# Patient Record
Sex: FEMALE | Race: BLACK OR AFRICAN AMERICAN | Employment: UNEMPLOYED | URBAN - METROPOLITAN AREA
[De-identification: names, ages, dates, MRNs, and addresses within clinical notes are randomized per-mention and may not be internally consistent; named-entity substitution may affect disease eponyms.]

---

## 2017-05-23 ENCOUNTER — HOSPITAL ENCOUNTER (EMERGENCY)
Age: 27
Discharge: LEFT BEFORE DISCHARGE | End: 2017-05-23
Attending: STUDENT IN AN ORGANIZED HEALTH CARE EDUCATION/TRAINING PROGRAM
Payer: COMMERCIAL

## 2017-05-23 VITALS
HEART RATE: 71 BPM | WEIGHT: 135 LBS | HEIGHT: 62 IN | OXYGEN SATURATION: 100 % | SYSTOLIC BLOOD PRESSURE: 110 MMHG | BODY MASS INDEX: 24.84 KG/M2 | DIASTOLIC BLOOD PRESSURE: 71 MMHG | RESPIRATION RATE: 18 BRPM | TEMPERATURE: 98.1 F

## 2017-05-23 DIAGNOSIS — Z91.199 PATIENT'S NONCOMPLIANCE WITH OTHER MEDICAL TREATMENT AND REGIMEN: Primary | ICD-10-CM

## 2017-05-23 DIAGNOSIS — Z34.90 EARLY STAGE OF PREGNANCY: ICD-10-CM

## 2017-05-23 DIAGNOSIS — O20.0 THREATENED MISCARRIAGE: ICD-10-CM

## 2017-05-23 LAB
ABO/RH: NORMAL
ALBUMIN SERPL-MCNC: 4.3 G/DL (ref 3.9–4.9)
ALP BLD-CCNC: 51 U/L (ref 40–130)
ALT SERPL-CCNC: 16 U/L (ref 0–33)
ANION GAP SERPL CALCULATED.3IONS-SCNC: 11 MEQ/L (ref 7–13)
ANISOCYTOSIS: ABNORMAL
APTT: 31.5 SEC (ref 21.6–35.4)
AST SERPL-CCNC: 15 U/L (ref 0–35)
BASOPHILS ABSOLUTE: 0.1 K/UL (ref 0–0.2)
BASOPHILS RELATIVE PERCENT: 1.4 %
BILIRUB SERPL-MCNC: 1.1 MG/DL (ref 0–1.2)
BUN BLDV-MCNC: 8 MG/DL (ref 6–20)
CALCIUM SERPL-MCNC: 9 MG/DL (ref 8.6–10.2)
CHLORIDE BLD-SCNC: 105 MEQ/L (ref 98–107)
CO2: 23 MEQ/L (ref 22–29)
CREAT SERPL-MCNC: 0.81 MG/DL (ref 0.5–0.9)
EOSINOPHILS ABSOLUTE: 0.1 K/UL (ref 0–0.7)
EOSINOPHILS RELATIVE PERCENT: 1.7 %
GFR AFRICAN AMERICAN: >60
GFR NON-AFRICAN AMERICAN: >60
GLOBULIN: 2.2 G/DL (ref 2.3–3.5)
GLUCOSE BLD-MCNC: 98 MG/DL (ref 74–109)
GONADOTROPIN, CHORIONIC (HCG) QUANT: <0.1 MIU/ML
HCT VFR BLD CALC: 38.8 % (ref 37–47)
HEMOGLOBIN: 12.1 G/DL (ref 12–16)
INR BLD: 1
LYMPHOCYTES ABSOLUTE: 2 K/UL (ref 1–4.8)
LYMPHOCYTES RELATIVE PERCENT: 27.8 %
MCH RBC QN AUTO: 23.2 PG (ref 27–31.3)
MCHC RBC AUTO-ENTMCNC: 31.2 % (ref 33–37)
MCV RBC AUTO: 74.5 FL (ref 82–100)
MICROCYTES: ABNORMAL
MONOCYTES ABSOLUTE: 0.4 K/UL (ref 0.2–0.8)
MONOCYTES RELATIVE PERCENT: 4.9 %
NEUTROPHILS ABSOLUTE: 4.7 K/UL (ref 1.4–6.5)
NEUTROPHILS RELATIVE PERCENT: 64.2 %
PDW BLD-RTO: 15.6 % (ref 11.5–14.5)
PLATELET # BLD: 261 K/UL (ref 130–400)
POTASSIUM SERPL-SCNC: 3.8 MEQ/L (ref 3.5–5.1)
PROTHROMBIN TIME: 10.7 SEC (ref 8.1–13.7)
RBC # BLD: 5.2 M/UL (ref 4.2–5.4)
SODIUM BLD-SCNC: 139 MEQ/L (ref 132–144)
TOTAL PROTEIN: 6.5 G/DL (ref 6.4–8.1)
WBC # BLD: 7.3 K/UL (ref 4.8–10.8)

## 2017-05-23 PROCEDURE — 84702 CHORIONIC GONADOTROPIN TEST: CPT

## 2017-05-23 PROCEDURE — 86900 BLOOD TYPING SEROLOGIC ABO: CPT

## 2017-05-23 PROCEDURE — 80053 COMPREHEN METABOLIC PANEL: CPT

## 2017-05-23 PROCEDURE — 99283 EMERGENCY DEPT VISIT LOW MDM: CPT

## 2017-05-23 PROCEDURE — 85025 COMPLETE CBC W/AUTO DIFF WBC: CPT

## 2017-05-23 PROCEDURE — 86901 BLOOD TYPING SEROLOGIC RH(D): CPT

## 2017-05-23 PROCEDURE — 85610 PROTHROMBIN TIME: CPT

## 2017-05-23 PROCEDURE — 85730 THROMBOPLASTIN TIME PARTIAL: CPT

## 2017-05-23 PROCEDURE — 36415 COLL VENOUS BLD VENIPUNCTURE: CPT

## 2017-05-23 ASSESSMENT — ENCOUNTER SYMPTOMS
VOMITING: 0
TROUBLE SWALLOWING: 0
COUGH: 0
ABDOMINAL PAIN: 0
SINUS PRESSURE: 0
BACK PAIN: 0
SHORTNESS OF BREATH: 0
CHEST TIGHTNESS: 0
DIARRHEA: 0

## 2017-09-11 ENCOUNTER — HOSPITAL ENCOUNTER (EMERGENCY)
Age: 27
Discharge: HOME | End: 2017-09-11
Payer: COMMERCIAL

## 2017-09-11 VITALS
DIASTOLIC BLOOD PRESSURE: 75 MMHG | BODY MASS INDEX: 21.4 KG/M2 | WEIGHT: 117 LBS | TEMPERATURE: 98.2 F | HEART RATE: 75 BPM | RESPIRATION RATE: 18 BRPM | OXYGEN SATURATION: 100 % | SYSTOLIC BLOOD PRESSURE: 114 MMHG

## 2017-09-11 DIAGNOSIS — N39.0 URINARY TRACT INFECTION WITHOUT HEMATURIA, SITE UNSPECIFIED: Primary | ICD-10-CM

## 2017-09-11 LAB
BACTERIA: ABNORMAL /HPF
BILIRUBIN URINE: NEGATIVE
BLOOD, URINE: NEGATIVE
CLARITY: ABNORMAL
COLOR: ABNORMAL
EPITHELIAL CELLS, UA: ABNORMAL /HPF
GLUCOSE URINE: NEGATIVE MG/DL
KETONES, URINE: NEGATIVE MG/DL
LEUKOCYTE ESTERASE, URINE: ABNORMAL
MUCUS: PRESENT
NITRITE, URINE: POSITIVE
PH UA: 6.5 (ref 5–9)
PROTEIN UA: NEGATIVE MG/DL
RBC UA: ABNORMAL /HPF (ref 0–2)
SPECIFIC GRAVITY UA: 1.01 (ref 1–1.03)
URINE REFLEX TO CULTURE: YES
UROBILINOGEN, URINE: 1 E.U./DL
WBC UA: ABNORMAL /HPF (ref 0–5)

## 2017-09-11 PROCEDURE — 87086 URINE CULTURE/COLONY COUNT: CPT

## 2017-09-11 PROCEDURE — 99283 EMERGENCY DEPT VISIT LOW MDM: CPT

## 2017-09-11 PROCEDURE — 81001 URINALYSIS AUTO W/SCOPE: CPT

## 2017-09-11 RX ORDER — FLUCONAZOLE 150 MG/1
150 TABLET ORAL ONCE
Qty: 2 TABLET | Refills: 0 | Status: SHIPPED | OUTPATIENT
Start: 2017-09-11 | End: 2017-09-11

## 2017-09-11 RX ORDER — CEPHALEXIN 500 MG/1
500 CAPSULE ORAL 4 TIMES DAILY
Qty: 12 CAPSULE | Refills: 0 | Status: SHIPPED | OUTPATIENT
Start: 2017-09-11 | End: 2017-09-14

## 2017-09-11 ASSESSMENT — PAIN DESCRIPTION - LOCATION: LOCATION: PELVIS

## 2017-09-11 ASSESSMENT — PAIN SCALES - GENERAL: PAINLEVEL_OUTOF10: 4

## 2017-09-11 ASSESSMENT — ENCOUNTER SYMPTOMS
SORE THROAT: 0
COUGH: 0
ABDOMINAL PAIN: 0
PHOTOPHOBIA: 0
TROUBLE SWALLOWING: 0
VOMITING: 0
SHORTNESS OF BREATH: 0
BACK PAIN: 0

## 2017-09-11 ASSESSMENT — PAIN DESCRIPTION - PAIN TYPE: TYPE: ACUTE PAIN

## 2017-09-12 LAB — URINE CULTURE, ROUTINE: NORMAL

## 2017-12-17 ENCOUNTER — HOSPITAL ENCOUNTER (EMERGENCY)
Age: 27
Discharge: HOME | End: 2017-12-17
Attending: EMERGENCY MEDICINE
Payer: COMMERCIAL

## 2017-12-17 VITALS
OXYGEN SATURATION: 100 % | HEART RATE: 75 BPM | RESPIRATION RATE: 17 BRPM | DIASTOLIC BLOOD PRESSURE: 82 MMHG | SYSTOLIC BLOOD PRESSURE: 126 MMHG

## 2017-12-17 DIAGNOSIS — Z13.9 ENCOUNTER FOR MEDICAL SCREENING EXAMINATION: Primary | ICD-10-CM

## 2017-12-17 PROCEDURE — 99283 EMERGENCY DEPT VISIT LOW MDM: CPT

## 2017-12-17 ASSESSMENT — ENCOUNTER SYMPTOMS
ABDOMINAL PAIN: 0
COUGH: 0
VOMITING: 0
ABDOMINAL DISTENTION: 0
SHORTNESS OF BREATH: 0
EYE DISCHARGE: 0
WHEEZING: 0
PHOTOPHOBIA: 0
CHEST TIGHTNESS: 0
SORE THROAT: 0

## 2017-12-17 NOTE — ED NOTES
Pt presents combative, screaming at staff. Pt uncooperative. Abusive towards staff, security. Dr Ling Lopez at bedside to medically clear pt.  [employer] at bedside due to pt under arrest. Pt refusing to answer all questions asked, refusing [address], RN  12/17/17 5530

## 2018-01-21 ENCOUNTER — APPOINTMENT (OUTPATIENT)
Dept: GENERAL RADIOLOGY | Age: 28
End: 2018-01-21
Payer: COMMERCIAL

## 2018-01-21 ENCOUNTER — HOSPITAL ENCOUNTER (EMERGENCY)
Age: 28
Discharge: HOME | End: 2018-01-21
Payer: COMMERCIAL

## 2018-01-21 VITALS
HEIGHT: 62 IN | TEMPERATURE: 98.3 F | SYSTOLIC BLOOD PRESSURE: 114 MMHG | HEART RATE: 84 BPM | OXYGEN SATURATION: 99 % | BODY MASS INDEX: 21.53 KG/M2 | DIASTOLIC BLOOD PRESSURE: 74 MMHG | WEIGHT: 117 LBS | RESPIRATION RATE: 18 BRPM

## 2018-01-21 DIAGNOSIS — J06.9 UPPER RESPIRATORY TRACT INFECTION, UNSPECIFIED TYPE: Primary | ICD-10-CM

## 2018-01-21 LAB
RAPID INFLUENZA  B AGN: NEGATIVE
RAPID INFLUENZA A AGN: NEGATIVE

## 2018-01-21 PROCEDURE — 86403 PARTICLE AGGLUT ANTBDY SCRN: CPT

## 2018-01-21 PROCEDURE — 99284 EMERGENCY DEPT VISIT MOD MDM: CPT

## 2018-01-21 PROCEDURE — 6370000000 HC RX 637 (ALT 250 FOR IP): Performed by: PHYSICIAN ASSISTANT

## 2018-01-21 PROCEDURE — 71046 X-RAY EXAM CHEST 2 VIEWS: CPT

## 2018-01-21 RX ORDER — AZITHROMYCIN 250 MG/1
TABLET, FILM COATED ORAL
Qty: 1 PACKET | Refills: 0 | Status: SHIPPED | OUTPATIENT
Start: 2018-01-21 | End: 2018-01-31

## 2018-01-21 RX ORDER — AZITHROMYCIN 500 MG/1
500 TABLET, FILM COATED ORAL ONCE
Status: COMPLETED | OUTPATIENT
Start: 2018-01-21 | End: 2018-01-21

## 2018-01-21 RX ORDER — PROMETHAZINE HYDROCHLORIDE AND CODEINE PHOSPHATE 6.25; 1 MG/5ML; MG/5ML
5 SYRUP ORAL 4 TIMES DAILY PRN
Qty: 60 ML | Refills: 0 | Status: SHIPPED | OUTPATIENT
Start: 2018-01-21 | End: 2018-01-24

## 2018-01-21 RX ORDER — GUAIFENESIN, PSEUDOEPHEDRINE HYDROCHLORIDE 600; 60 MG/1; MG/1
1 TABLET, EXTENDED RELEASE ORAL EVERY 12 HOURS
Qty: 14 TABLET | Refills: 0 | Status: SHIPPED | OUTPATIENT
Start: 2018-01-21 | End: 2018-01-28

## 2018-01-21 RX ADMIN — AZITHROMYCIN 500 MG: 500 TABLET, FILM COATED ORAL at 22:05

## 2018-01-21 ASSESSMENT — ENCOUNTER SYMPTOMS
SHORTNESS OF BREATH: 0
ABDOMINAL PAIN: 0
FACIAL SWELLING: 1
SORE THROAT: 1
EYE PAIN: 0
TROUBLE SWALLOWING: 0
APNEA: 0
COUGH: 1
COLOR CHANGE: 0
SINUS PRESSURE: 1
ALLERGIC/IMMUNOLOGIC NEGATIVE: 1
NAUSEA: 1
VOMITING: 1

## 2018-01-21 ASSESSMENT — PAIN DESCRIPTION - DESCRIPTORS: DESCRIPTORS: ACHING

## 2018-01-21 ASSESSMENT — PAIN DESCRIPTION - PAIN TYPE: TYPE: ACUTE PAIN

## 2018-01-21 ASSESSMENT — PAIN SCALES - GENERAL: PAINLEVEL_OUTOF10: 8

## 2018-01-21 ASSESSMENT — PAIN DESCRIPTION - LOCATION: LOCATION: GENERALIZED

## 2018-01-22 NOTE — ED PROVIDER NOTES
Days 2 through 5. PROMETHAZINE-CODEINE (PHENERGAN WITH CODEINE) 6.25-10 MG/5ML SYRUP    Take 5 mLs by mouth 4 times daily as needed for Cough for up to 3 days.     PSEUDOEPHEDRINE-GUAIFENESIN (MUCINEX D)  MG PER EXTENDED RELEASE TABLET    Take 1 tablet by mouth every 12 hours for 7 days       (Please note that portions of this note were completed with a voice recognition program.  Efforts were made to edit the dictations but occasionally words are mis-transcribed.)    STEPHEN Pulido PA-C  01/21/18 1398

## 2018-04-17 ENCOUNTER — HOSPITAL ENCOUNTER (EMERGENCY)
Age: 28
Discharge: HOME | End: 2018-04-17

## 2018-04-17 VITALS
WEIGHT: 120 LBS | HEIGHT: 62 IN | RESPIRATION RATE: 16 BRPM | DIASTOLIC BLOOD PRESSURE: 70 MMHG | OXYGEN SATURATION: 100 % | HEART RATE: 96 BPM | BODY MASS INDEX: 22.08 KG/M2 | SYSTOLIC BLOOD PRESSURE: 114 MMHG | TEMPERATURE: 98.3 F

## 2018-04-17 DIAGNOSIS — B37.31 YEAST INFECTION OF THE VAGINA: ICD-10-CM

## 2018-04-17 DIAGNOSIS — N39.0 URINARY TRACT INFECTION IN FEMALE: Primary | ICD-10-CM

## 2018-04-17 LAB
BACTERIA: ABNORMAL /HPF
BILIRUBIN URINE: NEGATIVE
BLOOD, URINE: ABNORMAL
CLARITY: ABNORMAL
CLUE CELLS: NORMAL
COLOR: YELLOW
EPITHELIAL CELLS, UA: ABNORMAL /HPF
GLUCOSE URINE: NEGATIVE MG/DL
KETONES, URINE: NEGATIVE MG/DL
LEUKOCYTE ESTERASE, URINE: ABNORMAL
NITRITE, URINE: NEGATIVE
PH UA: 7 (ref 5–9)
PREGNANCY TEST URINE, POC: NEGATIVE
PROTEIN UA: NEGATIVE MG/DL
RBC UA: ABNORMAL /HPF (ref 0–2)
SPECIFIC GRAVITY UA: 1.02 (ref 1–1.03)
TRICHOMONAS PREP: NORMAL
TRICHOMONAS VAGINALIS SCREEN: NEGATIVE
URINE REFLEX TO CULTURE: YES
UROBILINOGEN, URINE: 0.2 E.U./DL
WBC UA: ABNORMAL /HPF (ref 0–5)
YEAST WET PREP: NORMAL
YEAST: PRESENT

## 2018-04-17 PROCEDURE — 87086 URINE CULTURE/COLONY COUNT: CPT

## 2018-04-17 PROCEDURE — 87591 N.GONORRHOEAE DNA AMP PROB: CPT

## 2018-04-17 PROCEDURE — 87210 SMEAR WET MOUNT SALINE/INK: CPT

## 2018-04-17 PROCEDURE — 81001 URINALYSIS AUTO W/SCOPE: CPT

## 2018-04-17 PROCEDURE — 99283 EMERGENCY DEPT VISIT LOW MDM: CPT

## 2018-04-17 PROCEDURE — 87491 CHLMYD TRACH DNA AMP PROBE: CPT

## 2018-04-17 PROCEDURE — 87660 TRICHOMONAS VAGIN DIR PROBE: CPT

## 2018-04-17 RX ORDER — FLUCONAZOLE 150 MG/1
150 TABLET ORAL ONCE
Qty: 1 TABLET | Refills: 0 | Status: SHIPPED | OUTPATIENT
Start: 2018-04-17 | End: 2018-04-17

## 2018-04-17 RX ORDER — NITROFURANTOIN 25; 75 MG/1; MG/1
100 CAPSULE ORAL 2 TIMES DAILY
Qty: 20 CAPSULE | Refills: 0 | Status: SHIPPED | OUTPATIENT
Start: 2018-04-17 | End: 2018-04-27

## 2018-04-17 ASSESSMENT — ENCOUNTER SYMPTOMS
ABDOMINAL PAIN: 0
BACK PAIN: 0
SHORTNESS OF BREATH: 0
COUGH: 0

## 2018-04-18 LAB — URINE CULTURE, ROUTINE: NORMAL

## 2018-04-20 LAB
C TRACH DNA GENITAL QL NAA+PROBE: NEGATIVE
N. GONORRHOEAE DNA: NEGATIVE

## 2018-04-25 ENCOUNTER — HOSPITAL ENCOUNTER (EMERGENCY)
Age: 28
Discharge: HOME | End: 2018-04-25

## 2018-04-25 ENCOUNTER — APPOINTMENT (OUTPATIENT)
Dept: GENERAL RADIOLOGY | Age: 28
End: 2018-04-25

## 2018-04-25 VITALS
HEIGHT: 62 IN | WEIGHT: 123 LBS | SYSTOLIC BLOOD PRESSURE: 106 MMHG | TEMPERATURE: 98.5 F | HEART RATE: 82 BPM | DIASTOLIC BLOOD PRESSURE: 75 MMHG | BODY MASS INDEX: 22.63 KG/M2 | OXYGEN SATURATION: 98 % | RESPIRATION RATE: 16 BRPM

## 2018-04-25 DIAGNOSIS — R11.2 NON-INTRACTABLE VOMITING WITH NAUSEA, UNSPECIFIED VOMITING TYPE: Primary | ICD-10-CM

## 2018-04-25 DIAGNOSIS — M54.6 ACUTE BILATERAL THORACIC BACK PAIN: ICD-10-CM

## 2018-04-25 DIAGNOSIS — J06.9 UPPER RESPIRATORY TRACT INFECTION, UNSPECIFIED TYPE: ICD-10-CM

## 2018-04-25 LAB
ALBUMIN SERPL-MCNC: 4.6 G/DL (ref 3.9–4.9)
ALP BLD-CCNC: 70 U/L (ref 40–130)
ALT SERPL-CCNC: 30 U/L (ref 0–33)
ANION GAP SERPL CALCULATED.3IONS-SCNC: 17 MEQ/L (ref 7–13)
AST SERPL-CCNC: 36 U/L (ref 0–35)
BACTERIA: ABNORMAL /HPF
BASOPHILS ABSOLUTE: 0.1 K/UL (ref 0–0.2)
BASOPHILS RELATIVE PERCENT: 1.3 %
BILIRUB SERPL-MCNC: 0.3 MG/DL (ref 0–1.2)
BILIRUBIN URINE: NEGATIVE
BLOOD, URINE: ABNORMAL
BUN BLDV-MCNC: 13 MG/DL (ref 6–20)
CALCIUM SERPL-MCNC: 8.6 MG/DL (ref 8.6–10.2)
CHLORIDE BLD-SCNC: 101 MEQ/L (ref 98–107)
CHP ED QC CHECK: YES
CLARITY: ABNORMAL
CO2: 22 MEQ/L (ref 22–29)
COLOR: ABNORMAL
CREAT SERPL-MCNC: 0.96 MG/DL (ref 0.5–0.9)
EOSINOPHILS ABSOLUTE: 0 K/UL (ref 0–0.7)
EOSINOPHILS RELATIVE PERCENT: 0.6 %
EPITHELIAL CELLS, UA: ABNORMAL /HPF
GFR AFRICAN AMERICAN: >60
GFR NON-AFRICAN AMERICAN: >60
GLOBULIN: 2.6 G/DL (ref 2.3–3.5)
GLUCOSE BLD-MCNC: 86 MG/DL (ref 74–109)
GLUCOSE URINE: NEGATIVE MG/DL
HCT VFR BLD CALC: 39.7 % (ref 37–47)
HEMOGLOBIN: 13.2 G/DL (ref 12–16)
KETONES, URINE: ABNORMAL MG/DL
LEUKOCYTE ESTERASE, URINE: ABNORMAL
LIPASE: 32 U/L (ref 13–60)
LYMPHOCYTES ABSOLUTE: 4.3 K/UL (ref 1–4.8)
LYMPHOCYTES RELATIVE PERCENT: 63.7 %
MCH RBC QN AUTO: 23.7 PG (ref 27–31.3)
MCHC RBC AUTO-ENTMCNC: 33.2 % (ref 33–37)
MCV RBC AUTO: 71.3 FL (ref 82–100)
MICROCYTES: ABNORMAL
MONOCYTES ABSOLUTE: 0.6 K/UL (ref 0.2–0.8)
MONOCYTES RELATIVE PERCENT: 8.7 %
NEUTROPHILS ABSOLUTE: 1.7 K/UL (ref 1.4–6.5)
NEUTROPHILS RELATIVE PERCENT: 25.7 %
NITRITE, URINE: POSITIVE
OCCULT BLOOD, OTHER: NEGATIVE
PDW BLD-RTO: 16.4 % (ref 11.5–14.5)
PH UA: 5 (ref 5–9)
PLATELET # BLD: 276 K/UL (ref 130–400)
POTASSIUM SERPL-SCNC: 3.8 MEQ/L (ref 3.5–5.1)
PREGNANCY TEST URINE, POC: NEGATIVE
PROTEIN UA: 30 MG/DL
RBC # BLD: 5.57 M/UL (ref 4.2–5.4)
RBC UA: ABNORMAL /HPF (ref 0–2)
RENAL EPITHELIAL, UA: ABNORMAL /HPF
SLIDE REVIEW: ABNORMAL
SODIUM BLD-SCNC: 140 MEQ/L (ref 132–144)
SPECIFIC GRAVITY UA: 1.04 (ref 1–1.03)
STOMATOCYTES: ABNORMAL
TOTAL PROTEIN: 7.2 G/DL (ref 6.4–8.1)
URINE REFLEX TO CULTURE: YES
UROBILINOGEN, URINE: 1 E.U./DL
WBC # BLD: 6.7 K/UL (ref 4.8–10.8)
WBC UA: ABNORMAL /HPF (ref 0–5)

## 2018-04-25 PROCEDURE — 87086 URINE CULTURE/COLONY COUNT: CPT

## 2018-04-25 PROCEDURE — 36415 COLL VENOUS BLD VENIPUNCTURE: CPT

## 2018-04-25 PROCEDURE — 71046 X-RAY EXAM CHEST 2 VIEWS: CPT

## 2018-04-25 PROCEDURE — 99284 EMERGENCY DEPT VISIT MOD MDM: CPT

## 2018-04-25 PROCEDURE — 81001 URINALYSIS AUTO W/SCOPE: CPT

## 2018-04-25 PROCEDURE — 85025 COMPLETE CBC W/AUTO DIFF WBC: CPT

## 2018-04-25 PROCEDURE — 83690 ASSAY OF LIPASE: CPT

## 2018-04-25 PROCEDURE — 82271 OCCULT BLOOD OTHER SOURCES: CPT

## 2018-04-25 PROCEDURE — 2580000003 HC RX 258: Performed by: PHYSICIAN ASSISTANT

## 2018-04-25 PROCEDURE — 6360000002 HC RX W HCPCS: Performed by: PHYSICIAN ASSISTANT

## 2018-04-25 PROCEDURE — 80053 COMPREHEN METABOLIC PANEL: CPT

## 2018-04-25 RX ORDER — SODIUM CHLORIDE 0.9 % (FLUSH) 0.9 %
3 SYRINGE (ML) INJECTION EVERY 8 HOURS
Status: DISCONTINUED | OUTPATIENT
Start: 2018-04-25 | End: 2018-04-25 | Stop reason: HOSPADM

## 2018-04-25 RX ORDER — ONDANSETRON 4 MG/1
4 TABLET, ORALLY DISINTEGRATING ORAL ONCE
Status: COMPLETED | OUTPATIENT
Start: 2018-04-25 | End: 2018-04-25

## 2018-04-25 RX ORDER — ONDANSETRON 4 MG/1
4 TABLET, FILM COATED ORAL EVERY 8 HOURS PRN
Qty: 12 TABLET | Refills: 0 | Status: SHIPPED | OUTPATIENT
Start: 2018-04-25 | End: 2018-12-14

## 2018-04-25 RX ORDER — ALBUTEROL SULFATE 90 UG/1
AEROSOL, METERED RESPIRATORY (INHALATION)
Qty: 1 INHALER | Refills: 0 | Status: SHIPPED | OUTPATIENT
Start: 2018-04-25 | End: 2018-12-14

## 2018-04-25 RX ORDER — IBUPROFEN 400 MG/1
400 TABLET ORAL EVERY 8 HOURS PRN
Qty: 20 TABLET | Refills: 0 | Status: SHIPPED | OUTPATIENT
Start: 2018-04-25 | End: 2018-12-14

## 2018-04-25 RX ORDER — 0.9 % SODIUM CHLORIDE 0.9 %
1000 INTRAVENOUS SOLUTION INTRAVENOUS ONCE
Status: COMPLETED | OUTPATIENT
Start: 2018-04-25 | End: 2018-04-25

## 2018-04-25 RX ADMIN — ONDANSETRON 4 MG: 4 TABLET, ORALLY DISINTEGRATING ORAL at 00:54

## 2018-04-25 RX ADMIN — SODIUM CHLORIDE 1000 ML: 9 INJECTION, SOLUTION INTRAVENOUS at 00:54

## 2018-04-25 ASSESSMENT — PAIN DESCRIPTION - LOCATION: LOCATION: CHEST;BACK

## 2018-04-25 ASSESSMENT — ENCOUNTER SYMPTOMS
ABDOMINAL DISTENTION: 0
ANAL BLEEDING: 0
VOICE CHANGE: 0
EYE DISCHARGE: 0
APNEA: 0
PHOTOPHOBIA: 0

## 2018-04-25 ASSESSMENT — PAIN DESCRIPTION - ORIENTATION: ORIENTATION: UPPER

## 2018-04-25 ASSESSMENT — PAIN DESCRIPTION - PAIN TYPE: TYPE: ACUTE PAIN

## 2018-04-25 ASSESSMENT — PAIN SCALES - GENERAL: PAINLEVEL_OUTOF10: 8

## 2018-04-25 ASSESSMENT — PAIN DESCRIPTION - FREQUENCY: FREQUENCY: INTERMITTENT

## 2018-04-26 LAB — URINE CULTURE, ROUTINE: NORMAL

## 2018-12-14 ENCOUNTER — APPOINTMENT (OUTPATIENT)
Dept: CT IMAGING | Age: 28
End: 2018-12-14
Payer: MEDICAID

## 2018-12-14 ENCOUNTER — HOSPITAL ENCOUNTER (EMERGENCY)
Age: 28
Discharge: HOME | End: 2018-12-14
Payer: MEDICAID

## 2018-12-14 VITALS
TEMPERATURE: 97.9 F | OXYGEN SATURATION: 98 % | WEIGHT: 130 LBS | HEART RATE: 90 BPM | SYSTOLIC BLOOD PRESSURE: 117 MMHG | BODY MASS INDEX: 23.92 KG/M2 | HEIGHT: 62 IN | DIASTOLIC BLOOD PRESSURE: 75 MMHG | RESPIRATION RATE: 18 BRPM

## 2018-12-14 DIAGNOSIS — R10.9 ABDOMINAL PAIN, UNSPECIFIED ABDOMINAL LOCATION: Primary | ICD-10-CM

## 2018-12-14 LAB
ANION GAP SERPL CALCULATED.3IONS-SCNC: 14 MEQ/L (ref 7–13)
ANISOCYTOSIS: ABNORMAL
ATYPICAL LYMPHOCYTE RELATIVE PERCENT: 2 %
BASOPHILS ABSOLUTE: 0.1 K/UL (ref 0–0.2)
BASOPHILS RELATIVE PERCENT: 1 %
BILIRUBIN URINE: NEGATIVE
BLOOD, URINE: NEGATIVE
BUN BLDV-MCNC: 11 MG/DL (ref 6–20)
CALCIUM SERPL-MCNC: 8.9 MG/DL (ref 8.6–10.2)
CHLORIDE BLD-SCNC: 103 MEQ/L (ref 98–107)
CHP ED QC CHECK: YES
CLARITY: ABNORMAL
CO2: 23 MEQ/L (ref 22–29)
COLOR: YELLOW
CREAT SERPL-MCNC: 0.74 MG/DL (ref 0.5–0.9)
EOSINOPHILS ABSOLUTE: 0.1 K/UL (ref 0–0.7)
EOSINOPHILS RELATIVE PERCENT: 1 %
GFR AFRICAN AMERICAN: >60
GFR AFRICAN AMERICAN: >60
GFR NON-AFRICAN AMERICAN: 60
GFR NON-AFRICAN AMERICAN: >60
GLUCOSE BLD-MCNC: 89 MG/DL (ref 74–109)
GLUCOSE URINE: NEGATIVE MG/DL
HCT VFR BLD CALC: 40.3 % (ref 37–47)
HEMOGLOBIN: 13.3 G/DL (ref 12–16)
HYPOCHROMIA: ABNORMAL
KETONES, URINE: ABNORMAL MG/DL
LACTIC ACID: 2.2 MMOL/L (ref 0.5–2.2)
LEUKOCYTE ESTERASE, URINE: NEGATIVE
LYMPHOCYTES ABSOLUTE: 4.2 K/UL (ref 1–4.8)
LYMPHOCYTES RELATIVE PERCENT: 51 %
MCH RBC QN AUTO: 24 PG (ref 27–31.3)
MCHC RBC AUTO-ENTMCNC: 32.9 % (ref 33–37)
MCV RBC AUTO: 72.7 FL (ref 82–100)
MICROCYTES: ABNORMAL
MONOCYTES ABSOLUTE: 0.4 K/UL (ref 0.2–0.8)
MONOCYTES RELATIVE PERCENT: 4.7 %
NEUTROPHILS ABSOLUTE: 3.3 K/UL (ref 1.4–6.5)
NEUTROPHILS RELATIVE PERCENT: 41 %
NITRITE, URINE: NEGATIVE
PDW BLD-RTO: 16.9 % (ref 11.5–14.5)
PERFORMED ON: ABNORMAL
PH UA: 5.5 (ref 5–9)
PLATELET # BLD: 279 K/UL (ref 130–400)
POC CREATININE WHOLE BLOOD: 1.1
POC CREATININE: 1.1 MG/DL (ref 0.6–1.1)
POC SAMPLE TYPE: ABNORMAL
POIKILOCYTES: ABNORMAL
POTASSIUM SERPL-SCNC: 3.4 MEQ/L (ref 3.5–5.1)
PREGNANCY TEST URINE, POC: NEGATIVE
PROTEIN UA: ABNORMAL MG/DL
RBC # BLD: 5.53 M/UL (ref 4.2–5.4)
SODIUM BLD-SCNC: 140 MEQ/L (ref 132–144)
SPECIFIC GRAVITY UA: 1.02 (ref 1–1.03)
TARGET CELLS: ABNORMAL
URINE REFLEX TO CULTURE: ABNORMAL
UROBILINOGEN, URINE: 0.2 E.U./DL
WBC # BLD: 8 K/UL (ref 4.8–10.8)

## 2018-12-14 PROCEDURE — 96375 TX/PRO/DX INJ NEW DRUG ADDON: CPT

## 2018-12-14 PROCEDURE — 36415 COLL VENOUS BLD VENIPUNCTURE: CPT

## 2018-12-14 PROCEDURE — 85025 COMPLETE CBC W/AUTO DIFF WBC: CPT

## 2018-12-14 PROCEDURE — 2580000003 HC RX 258: Performed by: EMERGENCY MEDICINE

## 2018-12-14 PROCEDURE — 99284 EMERGENCY DEPT VISIT MOD MDM: CPT

## 2018-12-14 PROCEDURE — 80048 BASIC METABOLIC PNL TOTAL CA: CPT

## 2018-12-14 PROCEDURE — 83605 ASSAY OF LACTIC ACID: CPT

## 2018-12-14 PROCEDURE — 96365 THER/PROPH/DIAG IV INF INIT: CPT

## 2018-12-14 PROCEDURE — 74177 CT ABD & PELVIS W/CONTRAST: CPT

## 2018-12-14 PROCEDURE — 6360000002 HC RX W HCPCS: Performed by: EMERGENCY MEDICINE

## 2018-12-14 PROCEDURE — 81003 URINALYSIS AUTO W/O SCOPE: CPT

## 2018-12-14 PROCEDURE — 6360000004 HC RX CONTRAST MEDICATION: Performed by: RADIOLOGY

## 2018-12-14 RX ORDER — IBUPROFEN 600 MG/1
600 TABLET ORAL EVERY 6 HOURS PRN
Qty: 20 TABLET | Refills: 0 | Status: SHIPPED | OUTPATIENT
Start: 2018-12-14 | End: 2019-02-18

## 2018-12-14 RX ORDER — 0.9 % SODIUM CHLORIDE 0.9 %
1500 INTRAVENOUS SOLUTION INTRAVENOUS ONCE
Status: COMPLETED | OUTPATIENT
Start: 2018-12-14 | End: 2018-12-14

## 2018-12-14 RX ORDER — ONDANSETRON 2 MG/ML
4 INJECTION INTRAMUSCULAR; INTRAVENOUS ONCE
Status: COMPLETED | OUTPATIENT
Start: 2018-12-14 | End: 2018-12-14

## 2018-12-14 RX ORDER — SODIUM CHLORIDE 0.9 % (FLUSH) 0.9 %
3 SYRINGE (ML) INJECTION EVERY 8 HOURS
Status: DISCONTINUED | OUTPATIENT
Start: 2018-12-14 | End: 2018-12-14 | Stop reason: HOSPADM

## 2018-12-14 RX ADMIN — CEFTRIAXONE SODIUM 1 G: 1 INJECTION, POWDER, FOR SOLUTION INTRAMUSCULAR; INTRAVENOUS at 05:03

## 2018-12-14 RX ADMIN — SODIUM CHLORIDE 1500 ML: 9 INJECTION, SOLUTION INTRAVENOUS at 04:20

## 2018-12-14 RX ADMIN — IOPAMIDOL 100 ML: 612 INJECTION, SOLUTION INTRAVENOUS at 04:42

## 2018-12-14 RX ADMIN — ONDANSETRON 4 MG: 2 INJECTION INTRAMUSCULAR; INTRAVENOUS at 04:20

## 2018-12-14 RX ADMIN — HYDROMORPHONE HYDROCHLORIDE 0.5 MG: 1 INJECTION, SOLUTION INTRAMUSCULAR; INTRAVENOUS; SUBCUTANEOUS at 04:20

## 2018-12-14 ASSESSMENT — ENCOUNTER SYMPTOMS
VOMITING: 0
RHINORRHEA: 0
SORE THROAT: 0
SHORTNESS OF BREATH: 0
ABDOMINAL PAIN: 1
CONSTIPATION: 0
BACK PAIN: 0
ABDOMINAL DISTENTION: 0
COUGH: 0
CHEST TIGHTNESS: 0
COLOR CHANGE: 0
NAUSEA: 1
DIARRHEA: 1
WHEEZING: 0
TROUBLE SWALLOWING: 0

## 2018-12-14 ASSESSMENT — PAIN SCALES - GENERAL
PAINLEVEL_OUTOF10: 6
PAINLEVEL_OUTOF10: 4
PAINLEVEL_OUTOF10: 10
PAINLEVEL_OUTOF10: 10

## 2018-12-14 ASSESSMENT — PAIN DESCRIPTION - PAIN TYPE: TYPE: ACUTE PAIN

## 2018-12-14 ASSESSMENT — PAIN DESCRIPTION - ORIENTATION
ORIENTATION: LOWER;MID
ORIENTATION: MID;LOWER

## 2018-12-14 ASSESSMENT — PAIN DESCRIPTION - DESCRIPTORS: DESCRIPTORS: ACHING

## 2018-12-14 ASSESSMENT — PAIN DESCRIPTION - ONSET: ONSET: ON-GOING

## 2018-12-14 ASSESSMENT — PAIN DESCRIPTION - LOCATION
LOCATION: ABDOMEN
LOCATION: ABDOMEN

## 2019-02-08 ENCOUNTER — HOSPITAL ENCOUNTER (EMERGENCY)
Age: 29
Discharge: HOME | End: 2019-02-08
Payer: MEDICAID

## 2019-02-08 VITALS
SYSTOLIC BLOOD PRESSURE: 113 MMHG | DIASTOLIC BLOOD PRESSURE: 80 MMHG | BODY MASS INDEX: 23.92 KG/M2 | OXYGEN SATURATION: 98 % | RESPIRATION RATE: 18 BRPM | WEIGHT: 130 LBS | HEIGHT: 62 IN | TEMPERATURE: 99 F | HEART RATE: 80 BPM

## 2019-02-08 DIAGNOSIS — B34.9 VIRAL SYNDROME: Primary | ICD-10-CM

## 2019-02-08 LAB
RAPID INFLUENZA  B AGN: NEGATIVE
RAPID INFLUENZA A AGN: NEGATIVE
S PYO AG THROAT QL: NEGATIVE

## 2019-02-08 PROCEDURE — 87804 INFLUENZA ASSAY W/OPTIC: CPT

## 2019-02-08 PROCEDURE — 99283 EMERGENCY DEPT VISIT LOW MDM: CPT

## 2019-02-08 PROCEDURE — 87880 STREP A ASSAY W/OPTIC: CPT

## 2019-02-08 PROCEDURE — 87081 CULTURE SCREEN ONLY: CPT

## 2019-02-08 PROCEDURE — 6370000000 HC RX 637 (ALT 250 FOR IP): Performed by: NURSE PRACTITIONER

## 2019-02-08 RX ORDER — ACETAMINOPHEN 500 MG
1000 TABLET ORAL ONCE
Status: COMPLETED | OUTPATIENT
Start: 2019-02-08 | End: 2019-02-08

## 2019-02-08 RX ORDER — BROMPHENIRAMINE MALEATE, PSEUDOEPHEDRINE HYDROCHLORIDE, AND DEXTROMETHORPHAN HYDROBROMIDE 2; 30; 10 MG/5ML; MG/5ML; MG/5ML
10 SYRUP ORAL EVERY 6 HOURS PRN
Qty: 200 ML | Refills: 0 | Status: SHIPPED | OUTPATIENT
Start: 2019-02-08 | End: 2019-02-18

## 2019-02-08 RX ADMIN — ACETAMINOPHEN 1000 MG: 500 TABLET ORAL at 19:06

## 2019-02-08 ASSESSMENT — ENCOUNTER SYMPTOMS
TROUBLE SWALLOWING: 0
ABDOMINAL PAIN: 0
ABDOMINAL DISTENTION: 0
EYE PAIN: 0
COLOR CHANGE: 0
SORE THROAT: 0
COUGH: 1
VOMITING: 0
SHORTNESS OF BREATH: 0
DIARRHEA: 1
EYE REDNESS: 0
BACK PAIN: 0
EYE ITCHING: 0
WHEEZING: 0
NAUSEA: 0
VOICE CHANGE: 0
RHINORRHEA: 1

## 2019-02-08 ASSESSMENT — PAIN SCALES - GENERAL
PAINLEVEL_OUTOF10: 8
PAINLEVEL_OUTOF10: 8
PAINLEVEL_OUTOF10: 5
PAINLEVEL_OUTOF10: 6

## 2019-02-08 ASSESSMENT — PAIN DESCRIPTION - PAIN TYPE
TYPE: ACUTE PAIN
TYPE: ACUTE PAIN

## 2019-02-08 ASSESSMENT — PAIN DESCRIPTION - LOCATION: LOCATION: GENERALIZED

## 2019-02-11 LAB — S PYO THROAT QL CULT: NORMAL

## 2019-02-18 ENCOUNTER — HOSPITAL ENCOUNTER (INPATIENT)
Age: 29
LOS: 10 days | Discharge: HOME | DRG: 751 | End: 2019-02-28
Attending: STUDENT IN AN ORGANIZED HEALTH CARE EDUCATION/TRAINING PROGRAM | Admitting: PSYCHIATRY & NEUROLOGY
Payer: MEDICAID

## 2019-02-18 DIAGNOSIS — J02.9 PHARYNGITIS, UNSPECIFIED ETIOLOGY: ICD-10-CM

## 2019-02-18 DIAGNOSIS — F32.9 MAJOR DEPRESSIVE DISORDER, REMISSION STATUS UNSPECIFIED, UNSPECIFIED WHETHER RECURRENT: Primary | ICD-10-CM

## 2019-02-18 DIAGNOSIS — F12.10 MARIJUANA ABUSE: ICD-10-CM

## 2019-02-18 LAB
ACETAMINOPHEN LEVEL: <5 UG/ML (ref 10–30)
ALBUMIN SERPL-MCNC: 3.9 G/DL (ref 3.5–4.6)
ALP BLD-CCNC: 70 U/L (ref 40–130)
ALT SERPL-CCNC: 12 U/L (ref 0–33)
AMPHETAMINE SCREEN, URINE: POSITIVE
ANION GAP SERPL CALCULATED.3IONS-SCNC: 13 MEQ/L (ref 9–15)
ANISOCYTOSIS: ABNORMAL
AST SERPL-CCNC: 18 U/L (ref 0–35)
BACTERIA: ABNORMAL /HPF
BARBITURATE SCREEN URINE: ABNORMAL
BASOPHILS ABSOLUTE: 0.2 K/UL (ref 0–0.2)
BASOPHILS RELATIVE PERCENT: 1.8 %
BENZODIAZEPINE SCREEN, URINE: ABNORMAL
BILIRUB SERPL-MCNC: 0.5 MG/DL (ref 0.2–0.7)
BILIRUBIN URINE: NEGATIVE
BLOOD, URINE: NEGATIVE
BUN BLDV-MCNC: 13 MG/DL (ref 6–20)
CALCIUM SERPL-MCNC: 9.1 MG/DL (ref 8.5–9.9)
CANNABINOID SCREEN URINE: POSITIVE
CHLORIDE BLD-SCNC: 104 MEQ/L (ref 95–107)
CLARITY: ABNORMAL
CO2: 25 MEQ/L (ref 20–31)
COCAINE METABOLITE SCREEN URINE: ABNORMAL
COLOR: YELLOW
CREAT SERPL-MCNC: 0.77 MG/DL (ref 0.5–0.9)
EOSINOPHILS ABSOLUTE: 0.1 K/UL (ref 0–0.7)
EOSINOPHILS RELATIVE PERCENT: 0.9 %
EPITHELIAL CELLS, UA: >100 /HPF (ref 0–5)
ETHANOL PERCENT: NORMAL G/DL
ETHANOL: <10 MG/DL (ref 0–0.08)
GFR AFRICAN AMERICAN: >60
GFR NON-AFRICAN AMERICAN: >60
GLOBULIN: 3.1 G/DL (ref 2.3–3.5)
GLUCOSE BLD-MCNC: 80 MG/DL (ref 70–99)
GLUCOSE URINE: NEGATIVE MG/DL
HCG(URINE) PREGNANCY TEST: NEGATIVE
HCT VFR BLD CALC: 38.5 % (ref 37–47)
HEMOGLOBIN: 12.5 G/DL (ref 12–16)
HYALINE CASTS: ABNORMAL /HPF (ref 0–5)
HYPOCHROMIA: ABNORMAL
KETONES, URINE: ABNORMAL MG/DL
LEUKOCYTE ESTERASE, URINE: ABNORMAL
LYMPHOCYTES ABSOLUTE: 2.3 K/UL (ref 1–4.8)
LYMPHOCYTES RELATIVE PERCENT: 26.6 %
Lab: ABNORMAL
MCH RBC QN AUTO: 23.4 PG (ref 27–31.3)
MCHC RBC AUTO-ENTMCNC: 32.4 % (ref 33–37)
MCV RBC AUTO: 72.3 FL (ref 82–100)
MICROCYTES: ABNORMAL
MONOCYTES ABSOLUTE: 0.5 K/UL (ref 0.2–0.8)
MONOCYTES RELATIVE PERCENT: 5.3 %
NEUTROPHILS ABSOLUTE: 5.6 K/UL (ref 1.4–6.5)
NEUTROPHILS RELATIVE PERCENT: 65.4 %
NITRITE, URINE: NEGATIVE
OPIATE SCREEN URINE: ABNORMAL
OVALOCYTES: ABNORMAL
PDW BLD-RTO: 16.1 % (ref 11.5–14.5)
PH UA: 5.5 (ref 5–9)
PHENCYCLIDINE SCREEN URINE: ABNORMAL
PLATELET # BLD: 399 K/UL (ref 130–400)
POTASSIUM SERPL-SCNC: 4.1 MEQ/L (ref 3.4–4.9)
PROTEIN UA: NEGATIVE MG/DL
RBC # BLD: 5.33 M/UL (ref 4.2–5.4)
RBC UA: ABNORMAL /HPF (ref 0–5)
S PYO AG THROAT QL: NEGATIVE
SALICYLATE, SERUM: <0.3 MG/DL (ref 15–30)
SLIDE REVIEW: ABNORMAL
SODIUM BLD-SCNC: 142 MEQ/L (ref 135–144)
SPECIFIC GRAVITY UA: 1.03 (ref 1–1.03)
TARGET CELLS: ABNORMAL
TOTAL CK: 132 U/L (ref 0–170)
TOTAL PROTEIN: 7 G/DL (ref 6.3–8)
TSH SERPL DL<=0.05 MIU/L-ACNC: 1.27 UIU/ML (ref 0.44–3.86)
URINE REFLEX TO CULTURE: YES
UROBILINOGEN, URINE: 0.2 E.U./DL
WBC # BLD: 8.6 K/UL (ref 4.8–10.8)
WBC UA: ABNORMAL /HPF (ref 0–5)

## 2019-02-18 PROCEDURE — 87086 URINE CULTURE/COLONY COUNT: CPT

## 2019-02-18 PROCEDURE — 80307 DRUG TEST PRSMV CHEM ANLYZR: CPT

## 2019-02-18 PROCEDURE — 84443 ASSAY THYROID STIM HORMONE: CPT

## 2019-02-18 PROCEDURE — 85025 COMPLETE CBC W/AUTO DIFF WBC: CPT

## 2019-02-18 PROCEDURE — 81001 URINALYSIS AUTO W/SCOPE: CPT

## 2019-02-18 PROCEDURE — G0480 DRUG TEST DEF 1-7 CLASSES: HCPCS

## 2019-02-18 PROCEDURE — 6370000000 HC RX 637 (ALT 250 FOR IP): Performed by: STUDENT IN AN ORGANIZED HEALTH CARE EDUCATION/TRAINING PROGRAM

## 2019-02-18 PROCEDURE — 84703 CHORIONIC GONADOTROPIN ASSAY: CPT

## 2019-02-18 PROCEDURE — 99285 EMERGENCY DEPT VISIT HI MDM: CPT

## 2019-02-18 PROCEDURE — 36415 COLL VENOUS BLD VENIPUNCTURE: CPT

## 2019-02-18 PROCEDURE — 82550 ASSAY OF CK (CPK): CPT

## 2019-02-18 PROCEDURE — 1240000000 HC EMOTIONAL WELLNESS R&B

## 2019-02-18 PROCEDURE — 80053 COMPREHEN METABOLIC PANEL: CPT

## 2019-02-18 PROCEDURE — 87880 STREP A ASSAY W/OPTIC: CPT

## 2019-02-18 PROCEDURE — 87081 CULTURE SCREEN ONLY: CPT

## 2019-02-18 RX ORDER — ACETAMINOPHEN 500 MG
1000 TABLET ORAL ONCE
Status: COMPLETED | OUTPATIENT
Start: 2019-02-18 | End: 2019-02-18

## 2019-02-18 RX ORDER — TRAZODONE HYDROCHLORIDE 50 MG/1
50 TABLET ORAL NIGHTLY PRN
Status: DISCONTINUED | OUTPATIENT
Start: 2019-02-18 | End: 2019-02-19

## 2019-02-18 RX ORDER — HYDROXYZINE HYDROCHLORIDE 50 MG/ML
50 INJECTION, SOLUTION INTRAMUSCULAR EVERY 6 HOURS PRN
Status: DISCONTINUED | OUTPATIENT
Start: 2019-02-18 | End: 2019-02-28 | Stop reason: HOSPADM

## 2019-02-18 RX ORDER — HYDROXYZINE PAMOATE 25 MG/1
50 CAPSULE ORAL EVERY 6 HOURS PRN
Status: DISCONTINUED | OUTPATIENT
Start: 2019-02-18 | End: 2019-02-28 | Stop reason: HOSPADM

## 2019-02-18 RX ORDER — ACETAMINOPHEN 325 MG/1
650 TABLET ORAL EVERY 4 HOURS PRN
Status: DISCONTINUED | OUTPATIENT
Start: 2019-02-18 | End: 2019-02-28 | Stop reason: HOSPADM

## 2019-02-18 RX ORDER — MAGNESIUM HYDROXIDE/ALUMINUM HYDROXICE/SIMETHICONE 120; 1200; 1200 MG/30ML; MG/30ML; MG/30ML
30 SUSPENSION ORAL PRN
Status: DISCONTINUED | OUTPATIENT
Start: 2019-02-18 | End: 2019-02-28 | Stop reason: HOSPADM

## 2019-02-18 RX ORDER — HALOPERIDOL 5 MG/ML
5 INJECTION INTRAMUSCULAR EVERY 6 HOURS PRN
Status: DISCONTINUED | OUTPATIENT
Start: 2019-02-18 | End: 2019-02-28 | Stop reason: HOSPADM

## 2019-02-18 RX ORDER — BENZTROPINE MESYLATE 1 MG/ML
2 INJECTION INTRAMUSCULAR; INTRAVENOUS 2 TIMES DAILY PRN
Status: DISCONTINUED | OUTPATIENT
Start: 2019-02-18 | End: 2019-02-28 | Stop reason: HOSPADM

## 2019-02-18 RX ORDER — HALOPERIDOL 5 MG
5 TABLET ORAL EVERY 6 HOURS PRN
Status: DISCONTINUED | OUTPATIENT
Start: 2019-02-18 | End: 2019-02-28 | Stop reason: HOSPADM

## 2019-02-18 RX ADMIN — ACETAMINOPHEN 1000 MG: 500 TABLET ORAL at 17:45

## 2019-02-18 ASSESSMENT — PAIN SCALES - GENERAL: PAINLEVEL_OUTOF10: 9

## 2019-02-18 ASSESSMENT — ENCOUNTER SYMPTOMS
SHORTNESS OF BREATH: 0
NAUSEA: 0
SORE THROAT: 1
BACK PAIN: 0
TROUBLE SWALLOWING: 0
DIARRHEA: 0
CHEST TIGHTNESS: 0
VOMITING: 0
SINUS PRESSURE: 0
COUGH: 0
ABDOMINAL PAIN: 0

## 2019-02-18 ASSESSMENT — PATIENT HEALTH QUESTIONNAIRE - PHQ9: SUM OF ALL RESPONSES TO PHQ QUESTIONS 1-9: 27

## 2019-02-19 PROBLEM — F41.1 GAD (GENERALIZED ANXIETY DISORDER): Status: ACTIVE | Noted: 2019-02-19

## 2019-02-19 PROBLEM — F32.2 CURRENT SEVERE EPISODE OF MAJOR DEPRESSIVE DISORDER WITHOUT PSYCHOTIC FEATURES WITHOUT PRIOR EPISODE (HCC): Status: ACTIVE | Noted: 2019-02-18

## 2019-02-19 PROBLEM — F33.2 SEVERE EPISODE OF RECURRENT MAJOR DEPRESSIVE DISORDER, WITHOUT PSYCHOTIC FEATURES (HCC): Status: ACTIVE | Noted: 2019-02-18

## 2019-02-19 LAB
EKG ATRIAL RATE: 62 BPM
EKG P AXIS: 34 DEGREES
EKG P-R INTERVAL: 132 MS
EKG Q-T INTERVAL: 412 MS
EKG QRS DURATION: 98 MS
EKG QTC CALCULATION (BAZETT): 418 MS
EKG R AXIS: -54 DEGREES
EKG T AXIS: 0 DEGREES
EKG VENTRICULAR RATE: 62 BPM
RAPID INFLUENZA  B AGN: NEGATIVE
RAPID INFLUENZA A AGN: NEGATIVE

## 2019-02-19 PROCEDURE — 1240000000 HC EMOTIONAL WELLNESS R&B

## 2019-02-19 PROCEDURE — 93005 ELECTROCARDIOGRAM TRACING: CPT

## 2019-02-19 PROCEDURE — 87804 INFLUENZA ASSAY W/OPTIC: CPT

## 2019-02-19 PROCEDURE — 6370000000 HC RX 637 (ALT 250 FOR IP): Performed by: PSYCHIATRY & NEUROLOGY

## 2019-02-19 PROCEDURE — 94664 DEMO&/EVAL PT USE INHALER: CPT

## 2019-02-19 PROCEDURE — 87070 CULTURE OTHR SPECIMN AEROBIC: CPT

## 2019-02-19 PROCEDURE — 93010 ELECTROCARDIOGRAM REPORT: CPT | Performed by: INTERNAL MEDICINE

## 2019-02-19 PROCEDURE — 99223 1ST HOSP IP/OBS HIGH 75: CPT | Performed by: PSYCHIATRY & NEUROLOGY

## 2019-02-19 PROCEDURE — 6370000000 HC RX 637 (ALT 250 FOR IP): Performed by: PHYSICIAN ASSISTANT

## 2019-02-19 RX ORDER — AMOXICILLIN AND CLAVULANATE POTASSIUM 875; 125 MG/1; MG/1
1 TABLET, FILM COATED ORAL EVERY 12 HOURS SCHEDULED
Status: DISCONTINUED | OUTPATIENT
Start: 2019-02-19 | End: 2019-02-25

## 2019-02-19 RX ORDER — TRAZODONE HYDROCHLORIDE 50 MG/1
50 TABLET ORAL NIGHTLY
Status: DISCONTINUED | OUTPATIENT
Start: 2019-02-19 | End: 2019-02-20

## 2019-02-19 RX ORDER — IPRATROPIUM BROMIDE AND ALBUTEROL SULFATE 2.5; .5 MG/3ML; MG/3ML
1 SOLUTION RESPIRATORY (INHALATION) EVERY 6 HOURS PRN
Status: DISCONTINUED | OUTPATIENT
Start: 2019-02-19 | End: 2019-02-28 | Stop reason: HOSPADM

## 2019-02-19 RX ORDER — NITROFURANTOIN 25; 75 MG/1; MG/1
100 CAPSULE ORAL EVERY 12 HOURS SCHEDULED
Status: DISCONTINUED | OUTPATIENT
Start: 2019-02-19 | End: 2019-02-19

## 2019-02-19 RX ORDER — VENLAFAXINE HYDROCHLORIDE 37.5 MG/1
37.5 CAPSULE, EXTENDED RELEASE ORAL
Status: DISCONTINUED | OUTPATIENT
Start: 2019-02-19 | End: 2019-02-23

## 2019-02-19 RX ORDER — NICOTINE 21 MG/24HR
1 PATCH, TRANSDERMAL 24 HOURS TRANSDERMAL DAILY
Status: DISCONTINUED | OUTPATIENT
Start: 2019-02-19 | End: 2019-02-25

## 2019-02-19 RX ADMIN — AMOXICILLIN AND CLAVULANATE POTASSIUM 1 TABLET: 875; 125 TABLET, FILM COATED ORAL at 21:43

## 2019-02-19 RX ADMIN — HYDROXYZINE PAMOATE 50 MG: 50 CAPSULE ORAL at 20:06

## 2019-02-19 RX ADMIN — VENLAFAXINE HYDROCHLORIDE 37.5 MG: 37.5 CAPSULE, EXTENDED RELEASE ORAL at 11:07

## 2019-02-19 RX ADMIN — TRAZODONE HYDROCHLORIDE 50 MG: 50 TABLET ORAL at 20:09

## 2019-02-19 RX ADMIN — HYDROXYZINE PAMOATE 50 MG: 50 CAPSULE ORAL at 13:37

## 2019-02-19 ASSESSMENT — SLEEP AND FATIGUE QUESTIONNAIRES
DIFFICULTY FALLING ASLEEP: YES
DO YOU USE A SLEEP AID: YES
SLEEP PATTERN: DIFFICULTY FALLING ASLEEP;INSOMNIA;DISTURBED/INTERRUPTED SLEEP
DO YOU HAVE DIFFICULTY SLEEPING: YES
DIFFICULTY STAYING ASLEEP: YES
RESTFUL SLEEP: NO
DIFFICULTY ARISING: NO
AVERAGE NUMBER OF SLEEP HOURS: 4

## 2019-02-19 ASSESSMENT — LIFESTYLE VARIABLES: HISTORY_ALCOHOL_USE: NO

## 2019-02-19 ASSESSMENT — ENCOUNTER SYMPTOMS
ABDOMINAL PAIN: 0
SHORTNESS OF BREATH: 0
COUGH: 0

## 2019-02-20 LAB — URINE CULTURE, ROUTINE: NORMAL

## 2019-02-20 PROCEDURE — 99232 SBSQ HOSP IP/OBS MODERATE 35: CPT | Performed by: PSYCHIATRY & NEUROLOGY

## 2019-02-20 PROCEDURE — 6370000000 HC RX 637 (ALT 250 FOR IP): Performed by: PHYSICIAN ASSISTANT

## 2019-02-20 PROCEDURE — 94640 AIRWAY INHALATION TREATMENT: CPT

## 2019-02-20 PROCEDURE — 1240000000 HC EMOTIONAL WELLNESS R&B

## 2019-02-20 PROCEDURE — 6370000000 HC RX 637 (ALT 250 FOR IP): Performed by: PSYCHIATRY & NEUROLOGY

## 2019-02-20 RX ORDER — BUSPIRONE HYDROCHLORIDE 5 MG/1
7.5 TABLET ORAL 3 TIMES DAILY
Status: DISCONTINUED | OUTPATIENT
Start: 2019-02-20 | End: 2019-02-26

## 2019-02-20 RX ORDER — QUETIAPINE FUMARATE 50 MG/1
50 TABLET, FILM COATED ORAL NIGHTLY
Status: DISCONTINUED | OUTPATIENT
Start: 2019-02-20 | End: 2019-02-21

## 2019-02-20 RX ADMIN — QUETIAPINE FUMARATE 50 MG: 50 TABLET ORAL at 20:39

## 2019-02-20 RX ADMIN — BUSPIRONE HYDROCHLORIDE 7.5 MG: 7.5 TABLET ORAL at 14:36

## 2019-02-20 RX ADMIN — AMOXICILLIN AND CLAVULANATE POTASSIUM 1 TABLET: 875; 125 TABLET, FILM COATED ORAL at 08:41

## 2019-02-20 RX ADMIN — HYDROXYZINE PAMOATE 50 MG: 50 CAPSULE ORAL at 20:39

## 2019-02-20 RX ADMIN — AMOXICILLIN AND CLAVULANATE POTASSIUM 1 TABLET: 875; 125 TABLET, FILM COATED ORAL at 20:39

## 2019-02-20 RX ADMIN — VENLAFAXINE HYDROCHLORIDE 37.5 MG: 37.5 CAPSULE, EXTENDED RELEASE ORAL at 08:41

## 2019-02-20 RX ADMIN — IPRATROPIUM BROMIDE AND ALBUTEROL SULFATE 1 AMPULE: .5; 3 SOLUTION RESPIRATORY (INHALATION) at 21:27

## 2019-02-20 RX ADMIN — HYDROXYZINE PAMOATE 50 MG: 50 CAPSULE ORAL at 08:41

## 2019-02-20 RX ADMIN — BUSPIRONE HYDROCHLORIDE 7.5 MG: 7.5 TABLET ORAL at 20:39

## 2019-02-21 PROBLEM — F31.9 BIPOLAR DEPRESSION (HCC): Status: ACTIVE | Noted: 2019-02-18

## 2019-02-21 LAB
HCT VFR BLD CALC: 39 % (ref 37–47)
HEMOGLOBIN: 12.7 G/DL (ref 12–16)
MCH RBC QN AUTO: 23.7 PG (ref 27–31.3)
MCHC RBC AUTO-ENTMCNC: 32.4 % (ref 33–37)
MCV RBC AUTO: 73 FL (ref 82–100)
PDW BLD-RTO: 16.5 % (ref 11.5–14.5)
PLATELET # BLD: 355 K/UL (ref 130–400)
RBC # BLD: 5.34 M/UL (ref 4.2–5.4)
S PYO THROAT QL CULT: NORMAL
WBC # BLD: 7.3 K/UL (ref 4.8–10.8)

## 2019-02-21 PROCEDURE — 94640 AIRWAY INHALATION TREATMENT: CPT

## 2019-02-21 PROCEDURE — 85027 COMPLETE CBC AUTOMATED: CPT

## 2019-02-21 PROCEDURE — 6370000000 HC RX 637 (ALT 250 FOR IP): Performed by: PSYCHIATRY & NEUROLOGY

## 2019-02-21 PROCEDURE — 99232 SBSQ HOSP IP/OBS MODERATE 35: CPT | Performed by: PSYCHIATRY & NEUROLOGY

## 2019-02-21 PROCEDURE — 1240000000 HC EMOTIONAL WELLNESS R&B

## 2019-02-21 PROCEDURE — 6370000000 HC RX 637 (ALT 250 FOR IP): Performed by: PHYSICIAN ASSISTANT

## 2019-02-21 PROCEDURE — 36415 COLL VENOUS BLD VENIPUNCTURE: CPT

## 2019-02-21 RX ORDER — QUETIAPINE FUMARATE 100 MG/1
100 TABLET, FILM COATED ORAL NIGHTLY
Status: DISCONTINUED | OUTPATIENT
Start: 2019-02-21 | End: 2019-02-22

## 2019-02-21 RX ADMIN — AMOXICILLIN AND CLAVULANATE POTASSIUM 1 TABLET: 875; 125 TABLET, FILM COATED ORAL at 21:59

## 2019-02-21 RX ADMIN — VENLAFAXINE HYDROCHLORIDE 37.5 MG: 37.5 CAPSULE, EXTENDED RELEASE ORAL at 08:38

## 2019-02-21 RX ADMIN — BUSPIRONE HYDROCHLORIDE 7.5 MG: 7.5 TABLET ORAL at 13:38

## 2019-02-21 RX ADMIN — BUSPIRONE HYDROCHLORIDE 7.5 MG: 7.5 TABLET ORAL at 08:38

## 2019-02-21 RX ADMIN — AMOXICILLIN AND CLAVULANATE POTASSIUM 1 TABLET: 875; 125 TABLET, FILM COATED ORAL at 08:38

## 2019-02-21 RX ADMIN — QUETIAPINE FUMARATE 100 MG: 100 TABLET ORAL at 21:59

## 2019-02-21 RX ADMIN — HYDROXYZINE PAMOATE 50 MG: 50 CAPSULE ORAL at 13:38

## 2019-02-21 RX ADMIN — HYDROXYZINE PAMOATE 50 MG: 50 CAPSULE ORAL at 22:03

## 2019-02-21 RX ADMIN — IPRATROPIUM BROMIDE AND ALBUTEROL SULFATE 1 AMPULE: .5; 3 SOLUTION RESPIRATORY (INHALATION) at 19:33

## 2019-02-22 LAB — THROAT CULTURE: NORMAL

## 2019-02-22 PROCEDURE — 99232 SBSQ HOSP IP/OBS MODERATE 35: CPT | Performed by: PSYCHIATRY & NEUROLOGY

## 2019-02-22 PROCEDURE — 94640 AIRWAY INHALATION TREATMENT: CPT

## 2019-02-22 PROCEDURE — 1240000000 HC EMOTIONAL WELLNESS R&B

## 2019-02-22 PROCEDURE — 6370000000 HC RX 637 (ALT 250 FOR IP): Performed by: PHYSICIAN ASSISTANT

## 2019-02-22 PROCEDURE — 6370000000 HC RX 637 (ALT 250 FOR IP): Performed by: PSYCHIATRY & NEUROLOGY

## 2019-02-22 RX ADMIN — BUSPIRONE HYDROCHLORIDE 7.5 MG: 7.5 TABLET ORAL at 13:56

## 2019-02-22 RX ADMIN — VENLAFAXINE HYDROCHLORIDE 37.5 MG: 37.5 CAPSULE, EXTENDED RELEASE ORAL at 08:43

## 2019-02-22 RX ADMIN — HYDROXYZINE PAMOATE 50 MG: 50 CAPSULE ORAL at 15:01

## 2019-02-22 RX ADMIN — AMOXICILLIN AND CLAVULANATE POTASSIUM 1 TABLET: 875; 125 TABLET, FILM COATED ORAL at 08:43

## 2019-02-22 RX ADMIN — BUSPIRONE HYDROCHLORIDE 7.5 MG: 7.5 TABLET ORAL at 08:43

## 2019-02-22 RX ADMIN — HYDROXYZINE PAMOATE 50 MG: 50 CAPSULE ORAL at 21:15

## 2019-02-22 RX ADMIN — AMOXICILLIN AND CLAVULANATE POTASSIUM 1 TABLET: 875; 125 TABLET, FILM COATED ORAL at 21:15

## 2019-02-22 RX ADMIN — BUSPIRONE HYDROCHLORIDE 7.5 MG: 7.5 TABLET ORAL at 21:17

## 2019-02-22 RX ADMIN — HYDROXYZINE PAMOATE 50 MG: 50 CAPSULE ORAL at 08:55

## 2019-02-22 RX ADMIN — IPRATROPIUM BROMIDE AND ALBUTEROL SULFATE 1 AMPULE: .5; 3 SOLUTION RESPIRATORY (INHALATION) at 20:00

## 2019-02-22 RX ADMIN — QUETIAPINE FUMARATE 150 MG: 100 TABLET ORAL at 21:15

## 2019-02-23 PROCEDURE — 1240000000 HC EMOTIONAL WELLNESS R&B

## 2019-02-23 PROCEDURE — 6370000000 HC RX 637 (ALT 250 FOR IP): Performed by: PHYSICIAN ASSISTANT

## 2019-02-23 PROCEDURE — 94640 AIRWAY INHALATION TREATMENT: CPT

## 2019-02-23 PROCEDURE — 6370000000 HC RX 637 (ALT 250 FOR IP): Performed by: PSYCHIATRY & NEUROLOGY

## 2019-02-23 PROCEDURE — 94760 N-INVAS EAR/PLS OXIMETRY 1: CPT

## 2019-02-23 RX ORDER — QUETIAPINE FUMARATE 25 MG/1
25 TABLET, FILM COATED ORAL 2 TIMES DAILY WITH MEALS
Status: DISCONTINUED | OUTPATIENT
Start: 2019-02-24 | End: 2019-02-28 | Stop reason: HOSPADM

## 2019-02-23 RX ORDER — VENLAFAXINE HYDROCHLORIDE 75 MG/1
75 CAPSULE, EXTENDED RELEASE ORAL
Status: DISCONTINUED | OUTPATIENT
Start: 2019-02-24 | End: 2019-02-25

## 2019-02-23 RX ADMIN — IPRATROPIUM BROMIDE AND ALBUTEROL SULFATE 1 AMPULE: .5; 3 SOLUTION RESPIRATORY (INHALATION) at 10:10

## 2019-02-23 RX ADMIN — HYDROXYZINE PAMOATE 50 MG: 50 CAPSULE ORAL at 15:43

## 2019-02-23 RX ADMIN — HYDROXYZINE PAMOATE 50 MG: 50 CAPSULE ORAL at 09:03

## 2019-02-23 RX ADMIN — AMOXICILLIN AND CLAVULANATE POTASSIUM 1 TABLET: 875; 125 TABLET, FILM COATED ORAL at 09:01

## 2019-02-23 RX ADMIN — QUETIAPINE FUMARATE 150 MG: 100 TABLET ORAL at 21:21

## 2019-02-23 RX ADMIN — VENLAFAXINE HYDROCHLORIDE 37.5 MG: 37.5 CAPSULE, EXTENDED RELEASE ORAL at 09:01

## 2019-02-23 RX ADMIN — BUSPIRONE HYDROCHLORIDE 7.5 MG: 7.5 TABLET ORAL at 14:14

## 2019-02-23 RX ADMIN — BUSPIRONE HYDROCHLORIDE 7.5 MG: 7.5 TABLET ORAL at 21:24

## 2019-02-23 RX ADMIN — HYDROXYZINE PAMOATE 50 MG: 50 CAPSULE ORAL at 21:46

## 2019-02-23 RX ADMIN — IPRATROPIUM BROMIDE AND ALBUTEROL SULFATE 1 AMPULE: .5; 3 SOLUTION RESPIRATORY (INHALATION) at 19:26

## 2019-02-23 RX ADMIN — BUSPIRONE HYDROCHLORIDE 7.5 MG: 7.5 TABLET ORAL at 09:33

## 2019-02-23 RX ADMIN — AMOXICILLIN AND CLAVULANATE POTASSIUM 1 TABLET: 875; 125 TABLET, FILM COATED ORAL at 21:21

## 2019-02-24 LAB
FOLATE: 11 NG/ML (ref 7.3–26.1)
VITAMIN B-12: 483 PG/ML (ref 232–1245)

## 2019-02-24 PROCEDURE — 1240000000 HC EMOTIONAL WELLNESS R&B

## 2019-02-24 PROCEDURE — 6370000000 HC RX 637 (ALT 250 FOR IP): Performed by: PSYCHIATRY & NEUROLOGY

## 2019-02-24 PROCEDURE — 94640 AIRWAY INHALATION TREATMENT: CPT

## 2019-02-24 PROCEDURE — 36415 COLL VENOUS BLD VENIPUNCTURE: CPT

## 2019-02-24 PROCEDURE — 82607 VITAMIN B-12: CPT

## 2019-02-24 PROCEDURE — 6370000000 HC RX 637 (ALT 250 FOR IP): Performed by: PHYSICIAN ASSISTANT

## 2019-02-24 PROCEDURE — 82746 ASSAY OF FOLIC ACID SERUM: CPT

## 2019-02-24 RX ORDER — QUETIAPINE FUMARATE 200 MG/1
200 TABLET, FILM COATED ORAL NIGHTLY
Status: DISCONTINUED | OUTPATIENT
Start: 2019-02-24 | End: 2019-02-28 | Stop reason: HOSPADM

## 2019-02-24 RX ADMIN — QUETIAPINE FUMARATE 200 MG: 200 TABLET ORAL at 22:01

## 2019-02-24 RX ADMIN — QUETIAPINE FUMARATE 25 MG: 25 TABLET ORAL at 08:30

## 2019-02-24 RX ADMIN — AMOXICILLIN AND CLAVULANATE POTASSIUM 1 TABLET: 875; 125 TABLET, FILM COATED ORAL at 21:17

## 2019-02-24 RX ADMIN — QUETIAPINE FUMARATE 25 MG: 25 TABLET ORAL at 17:18

## 2019-02-24 RX ADMIN — HYDROXYZINE PAMOATE 50 MG: 50 CAPSULE ORAL at 07:42

## 2019-02-24 RX ADMIN — VENLAFAXINE HYDROCHLORIDE 75 MG: 75 CAPSULE, EXTENDED RELEASE ORAL at 08:30

## 2019-02-24 RX ADMIN — HYDROXYZINE PAMOATE 50 MG: 50 CAPSULE ORAL at 16:04

## 2019-02-24 RX ADMIN — IPRATROPIUM BROMIDE AND ALBUTEROL SULFATE 1 AMPULE: .5; 3 SOLUTION RESPIRATORY (INHALATION) at 15:51

## 2019-02-24 RX ADMIN — HYDROXYZINE PAMOATE 50 MG: 50 CAPSULE ORAL at 22:01

## 2019-02-24 RX ADMIN — IPRATROPIUM BROMIDE AND ALBUTEROL SULFATE 1 AMPULE: .5; 3 SOLUTION RESPIRATORY (INHALATION) at 19:14

## 2019-02-24 RX ADMIN — AMOXICILLIN AND CLAVULANATE POTASSIUM 1 TABLET: 875; 125 TABLET, FILM COATED ORAL at 08:30

## 2019-02-24 RX ADMIN — IPRATROPIUM BROMIDE AND ALBUTEROL SULFATE 1 AMPULE: .5; 3 SOLUTION RESPIRATORY (INHALATION) at 07:16

## 2019-02-24 RX ADMIN — BUSPIRONE HYDROCHLORIDE 7.5 MG: 7.5 TABLET ORAL at 10:52

## 2019-02-24 RX ADMIN — BUSPIRONE HYDROCHLORIDE 7.5 MG: 7.5 TABLET ORAL at 22:23

## 2019-02-25 PROCEDURE — 6370000000 HC RX 637 (ALT 250 FOR IP): Performed by: PSYCHIATRY & NEUROLOGY

## 2019-02-25 PROCEDURE — 1240000000 HC EMOTIONAL WELLNESS R&B

## 2019-02-25 PROCEDURE — 94640 AIRWAY INHALATION TREATMENT: CPT

## 2019-02-25 PROCEDURE — 99232 SBSQ HOSP IP/OBS MODERATE 35: CPT | Performed by: PSYCHIATRY & NEUROLOGY

## 2019-02-25 PROCEDURE — 6370000000 HC RX 637 (ALT 250 FOR IP): Performed by: NURSE PRACTITIONER

## 2019-02-25 PROCEDURE — 94760 N-INVAS EAR/PLS OXIMETRY 1: CPT

## 2019-02-25 PROCEDURE — 6370000000 HC RX 637 (ALT 250 FOR IP): Performed by: PHYSICIAN ASSISTANT

## 2019-02-25 RX ORDER — DIVALPROEX SODIUM 250 MG/1
250 TABLET, DELAYED RELEASE ORAL 3 TIMES DAILY
Status: DISCONTINUED | OUTPATIENT
Start: 2019-02-25 | End: 2019-02-28 | Stop reason: HOSPADM

## 2019-02-25 RX ORDER — NITROFURANTOIN 25; 75 MG/1; MG/1
100 CAPSULE ORAL EVERY 12 HOURS SCHEDULED
Status: DISCONTINUED | OUTPATIENT
Start: 2019-02-25 | End: 2019-02-28 | Stop reason: HOSPADM

## 2019-02-25 RX ADMIN — QUETIAPINE FUMARATE 25 MG: 25 TABLET ORAL at 16:47

## 2019-02-25 RX ADMIN — NITROFURANTOIN MONOHYDRATE/MACROCRYSTALLINE 100 MG: 25; 75 CAPSULE ORAL at 10:39

## 2019-02-25 RX ADMIN — HYDROXYZINE PAMOATE 50 MG: 50 CAPSULE ORAL at 23:15

## 2019-02-25 RX ADMIN — VENLAFAXINE HYDROCHLORIDE 75 MG: 75 CAPSULE, EXTENDED RELEASE ORAL at 09:24

## 2019-02-25 RX ADMIN — IPRATROPIUM BROMIDE AND ALBUTEROL SULFATE 1 AMPULE: .5; 3 SOLUTION RESPIRATORY (INHALATION) at 19:51

## 2019-02-25 RX ADMIN — HYDROXYZINE PAMOATE 50 MG: 50 CAPSULE ORAL at 09:44

## 2019-02-25 RX ADMIN — DIVALPROEX SODIUM 250 MG: 250 TABLET, DELAYED RELEASE ORAL at 10:39

## 2019-02-25 RX ADMIN — QUETIAPINE FUMARATE 200 MG: 200 TABLET ORAL at 23:15

## 2019-02-25 RX ADMIN — IPRATROPIUM BROMIDE AND ALBUTEROL SULFATE 1 AMPULE: .5; 3 SOLUTION RESPIRATORY (INHALATION) at 08:57

## 2019-02-25 RX ADMIN — QUETIAPINE FUMARATE 25 MG: 25 TABLET ORAL at 09:24

## 2019-02-25 RX ADMIN — NITROFURANTOIN MONOHYDRATE/MACROCRYSTALLINE 100 MG: 25; 75 CAPSULE ORAL at 21:19

## 2019-02-25 RX ADMIN — DIVALPROEX SODIUM 250 MG: 250 TABLET, DELAYED RELEASE ORAL at 13:49

## 2019-02-25 RX ADMIN — DIVALPROEX SODIUM 250 MG: 250 TABLET, DELAYED RELEASE ORAL at 21:20

## 2019-02-25 RX ADMIN — HYDROXYZINE PAMOATE 50 MG: 50 CAPSULE ORAL at 16:47

## 2019-02-26 PROCEDURE — 6370000000 HC RX 637 (ALT 250 FOR IP): Performed by: PHYSICIAN ASSISTANT

## 2019-02-26 PROCEDURE — 94640 AIRWAY INHALATION TREATMENT: CPT

## 2019-02-26 PROCEDURE — 6370000000 HC RX 637 (ALT 250 FOR IP): Performed by: NURSE PRACTITIONER

## 2019-02-26 PROCEDURE — 99232 SBSQ HOSP IP/OBS MODERATE 35: CPT | Performed by: PSYCHIATRY & NEUROLOGY

## 2019-02-26 PROCEDURE — 1240000000 HC EMOTIONAL WELLNESS R&B

## 2019-02-26 PROCEDURE — 6370000000 HC RX 637 (ALT 250 FOR IP): Performed by: PSYCHIATRY & NEUROLOGY

## 2019-02-26 RX ADMIN — QUETIAPINE FUMARATE 25 MG: 25 TABLET ORAL at 13:01

## 2019-02-26 RX ADMIN — HYDROXYZINE PAMOATE 50 MG: 50 CAPSULE ORAL at 08:22

## 2019-02-26 RX ADMIN — MELATONIN TAB 3 MG 5 MG: 3 TAB at 21:30

## 2019-02-26 RX ADMIN — HYDROXYZINE PAMOATE 50 MG: 50 CAPSULE ORAL at 19:58

## 2019-02-26 RX ADMIN — NITROFURANTOIN MONOHYDRATE/MACROCRYSTALLINE 100 MG: 25; 75 CAPSULE ORAL at 19:58

## 2019-02-26 RX ADMIN — IPRATROPIUM BROMIDE AND ALBUTEROL SULFATE 1 AMPULE: .5; 3 SOLUTION RESPIRATORY (INHALATION) at 19:25

## 2019-02-26 RX ADMIN — QUETIAPINE FUMARATE 25 MG: 25 TABLET ORAL at 08:22

## 2019-02-26 RX ADMIN — QUETIAPINE FUMARATE 200 MG: 200 TABLET ORAL at 21:30

## 2019-02-26 RX ADMIN — NITROFURANTOIN MONOHYDRATE/MACROCRYSTALLINE 100 MG: 25; 75 CAPSULE ORAL at 08:22

## 2019-02-26 RX ADMIN — DIVALPROEX SODIUM 250 MG: 250 TABLET, DELAYED RELEASE ORAL at 08:21

## 2019-02-26 RX ADMIN — DIVALPROEX SODIUM 250 MG: 250 TABLET, DELAYED RELEASE ORAL at 19:58

## 2019-02-26 RX ADMIN — DIVALPROEX SODIUM 250 MG: 250 TABLET, DELAYED RELEASE ORAL at 14:19

## 2019-02-27 PROCEDURE — 6370000000 HC RX 637 (ALT 250 FOR IP): Performed by: PSYCHIATRY & NEUROLOGY

## 2019-02-27 PROCEDURE — 6370000000 HC RX 637 (ALT 250 FOR IP): Performed by: NURSE PRACTITIONER

## 2019-02-27 PROCEDURE — 1240000000 HC EMOTIONAL WELLNESS R&B

## 2019-02-27 PROCEDURE — 99232 SBSQ HOSP IP/OBS MODERATE 35: CPT | Performed by: PSYCHIATRY & NEUROLOGY

## 2019-02-27 PROCEDURE — 90833 PSYTX W PT W E/M 30 MIN: CPT | Performed by: PSYCHIATRY & NEUROLOGY

## 2019-02-27 RX ADMIN — HYDROXYZINE PAMOATE 50 MG: 50 CAPSULE ORAL at 23:21

## 2019-02-27 RX ADMIN — HYDROXYZINE PAMOATE 50 MG: 50 CAPSULE ORAL at 09:00

## 2019-02-27 RX ADMIN — MELATONIN TAB 3 MG 5 MG: 3 TAB at 20:50

## 2019-02-27 RX ADMIN — QUETIAPINE FUMARATE 25 MG: 25 TABLET ORAL at 13:50

## 2019-02-27 RX ADMIN — NITROFURANTOIN MONOHYDRATE/MACROCRYSTALLINE 100 MG: 25; 75 CAPSULE ORAL at 20:50

## 2019-02-27 RX ADMIN — DIVALPROEX SODIUM 250 MG: 250 TABLET, DELAYED RELEASE ORAL at 13:50

## 2019-02-27 RX ADMIN — DIVALPROEX SODIUM 250 MG: 250 TABLET, DELAYED RELEASE ORAL at 20:51

## 2019-02-27 RX ADMIN — QUETIAPINE FUMARATE 25 MG: 25 TABLET ORAL at 09:00

## 2019-02-27 RX ADMIN — DIVALPROEX SODIUM 250 MG: 250 TABLET, DELAYED RELEASE ORAL at 09:00

## 2019-02-27 RX ADMIN — HYDROXYZINE PAMOATE 50 MG: 50 CAPSULE ORAL at 16:59

## 2019-02-27 RX ADMIN — QUETIAPINE FUMARATE 200 MG: 200 TABLET ORAL at 20:50

## 2019-02-27 RX ADMIN — NITROFURANTOIN MONOHYDRATE/MACROCRYSTALLINE 100 MG: 25; 75 CAPSULE ORAL at 09:00

## 2019-02-28 VITALS
SYSTOLIC BLOOD PRESSURE: 105 MMHG | WEIGHT: 120 LBS | HEIGHT: 62 IN | TEMPERATURE: 97 F | DIASTOLIC BLOOD PRESSURE: 62 MMHG | BODY MASS INDEX: 22.08 KG/M2 | HEART RATE: 101 BPM | RESPIRATION RATE: 16 BRPM | OXYGEN SATURATION: 100 %

## 2019-02-28 LAB — VALPROIC ACID LEVEL: 55.6 UG/ML (ref 50–100)

## 2019-02-28 PROCEDURE — 80164 ASSAY DIPROPYLACETIC ACD TOT: CPT

## 2019-02-28 PROCEDURE — 6370000000 HC RX 637 (ALT 250 FOR IP): Performed by: PSYCHIATRY & NEUROLOGY

## 2019-02-28 PROCEDURE — 99239 HOSP IP/OBS DSCHRG MGMT >30: CPT | Performed by: PSYCHIATRY & NEUROLOGY

## 2019-02-28 PROCEDURE — 36415 COLL VENOUS BLD VENIPUNCTURE: CPT

## 2019-02-28 PROCEDURE — 6370000000 HC RX 637 (ALT 250 FOR IP): Performed by: NURSE PRACTITIONER

## 2019-02-28 RX ORDER — UREA 10 %
5 LOTION (ML) TOPICAL NIGHTLY
Qty: 15 TABLET | Refills: 2 | Status: SHIPPED | OUTPATIENT
Start: 2019-02-28 | End: 2020-08-17

## 2019-02-28 RX ORDER — QUETIAPINE FUMARATE 200 MG/1
200 TABLET, FILM COATED ORAL NIGHTLY
Qty: 14 TABLET | Refills: 0 | Status: SHIPPED | OUTPATIENT
Start: 2019-02-28 | End: 2020-08-17

## 2019-02-28 RX ORDER — DIVALPROEX SODIUM 250 MG/1
250 TABLET, DELAYED RELEASE ORAL 3 TIMES DAILY
Qty: 42 TABLET | Refills: 0 | Status: SHIPPED | OUTPATIENT
Start: 2019-02-28 | End: 2020-08-17

## 2019-02-28 RX ORDER — QUETIAPINE FUMARATE 25 MG/1
25 TABLET, FILM COATED ORAL 2 TIMES DAILY WITH MEALS
Qty: 28 TABLET | Refills: 2 | Status: SHIPPED | OUTPATIENT
Start: 2019-02-28 | End: 2020-08-17

## 2019-02-28 RX ORDER — NITROFURANTOIN 25; 75 MG/1; MG/1
100 CAPSULE ORAL EVERY 12 HOURS SCHEDULED
Qty: 7 CAPSULE | Refills: 0 | Status: SHIPPED | OUTPATIENT
Start: 2019-02-28 | End: 2019-03-10

## 2019-02-28 RX ORDER — HYDROXYZINE PAMOATE 50 MG/1
50 CAPSULE ORAL EVERY 6 HOURS PRN
Qty: 30 CAPSULE | Refills: 2 | Status: SHIPPED | OUTPATIENT
Start: 2019-02-28 | End: 2019-03-14

## 2019-02-28 RX ORDER — QUETIAPINE FUMARATE 25 MG/1
25 TABLET, FILM COATED ORAL 2 TIMES DAILY WITH MEALS
Qty: 60 TABLET | Refills: 2 | Status: SHIPPED | OUTPATIENT
Start: 2019-02-28 | End: 2019-02-28

## 2019-02-28 RX ORDER — DIVALPROEX SODIUM 250 MG/1
250 TABLET, DELAYED RELEASE ORAL 3 TIMES DAILY
Qty: 45 TABLET | Refills: 2 | Status: SHIPPED | OUTPATIENT
Start: 2019-02-28 | End: 2019-02-28

## 2019-02-28 RX ORDER — QUETIAPINE FUMARATE 200 MG/1
200 TABLET, FILM COATED ORAL NIGHTLY
Qty: 15 TABLET | Refills: 2 | Status: SHIPPED | OUTPATIENT
Start: 2019-02-28 | End: 2019-02-28

## 2019-02-28 RX ORDER — NITROFURANTOIN 25; 75 MG/1; MG/1
100 CAPSULE ORAL EVERY 12 HOURS SCHEDULED
Qty: 7 CAPSULE | Refills: 0 | Status: SHIPPED | OUTPATIENT
Start: 2019-02-28 | End: 2019-02-28

## 2019-02-28 RX ADMIN — NITROFURANTOIN MONOHYDRATE/MACROCRYSTALLINE 100 MG: 25; 75 CAPSULE ORAL at 08:32

## 2019-02-28 RX ADMIN — QUETIAPINE FUMARATE 25 MG: 25 TABLET ORAL at 08:31

## 2019-02-28 RX ADMIN — DIVALPROEX SODIUM 250 MG: 250 TABLET, DELAYED RELEASE ORAL at 08:31

## 2019-02-28 RX ADMIN — HYDROXYZINE PAMOATE 50 MG: 50 CAPSULE ORAL at 08:33

## 2019-02-28 RX ADMIN — QUETIAPINE FUMARATE 25 MG: 25 TABLET ORAL at 14:09

## 2019-02-28 RX ADMIN — DIVALPROEX SODIUM 250 MG: 250 TABLET, DELAYED RELEASE ORAL at 14:09

## 2019-03-25 ENCOUNTER — HOSPITAL ENCOUNTER (EMERGENCY)
Age: 29
Discharge: HOME | End: 2019-03-25
Payer: MEDICAID

## 2019-03-25 VITALS
DIASTOLIC BLOOD PRESSURE: 77 MMHG | RESPIRATION RATE: 16 BRPM | HEART RATE: 67 BPM | OXYGEN SATURATION: 99 % | TEMPERATURE: 98.3 F | SYSTOLIC BLOOD PRESSURE: 144 MMHG

## 2019-03-25 DIAGNOSIS — Z13.9 ENCOUNTER FOR MEDICAL SCREENING EXAMINATION: Primary | ICD-10-CM

## 2019-03-25 PROCEDURE — 99284 EMERGENCY DEPT VISIT MOD MDM: CPT

## 2020-03-10 ENCOUNTER — HOSPITAL ENCOUNTER (EMERGENCY)
Age: 30
Discharge: HOME | End: 2020-03-10
Payer: MEDICARE

## 2020-03-10 VITALS
HEART RATE: 82 BPM | SYSTOLIC BLOOD PRESSURE: 131 MMHG | BODY MASS INDEX: 31.28 KG/M2 | RESPIRATION RATE: 18 BRPM | DIASTOLIC BLOOD PRESSURE: 76 MMHG | OXYGEN SATURATION: 100 % | TEMPERATURE: 98.2 F | HEIGHT: 62 IN | WEIGHT: 170 LBS

## 2020-03-10 LAB
BACTERIA: NEGATIVE /HPF
BILIRUBIN URINE: NEGATIVE
BLOOD, URINE: NEGATIVE
CLARITY: CLEAR
COLOR: YELLOW
EPITHELIAL CELLS, UA: ABNORMAL /HPF (ref 0–5)
GLUCOSE URINE: NEGATIVE MG/DL
HCG, URINE, POC: NEGATIVE
HYALINE CASTS: ABNORMAL /HPF (ref 0–5)
KETONES, URINE: ABNORMAL MG/DL
LEUKOCYTE ESTERASE, URINE: ABNORMAL
Lab: NORMAL
NEGATIVE QC PASS/FAIL: NORMAL
NITRITE, URINE: NEGATIVE
PH UA: 5.5 (ref 5–9)
POSITIVE QC PASS/FAIL: NORMAL
PROTEIN UA: ABNORMAL MG/DL
RBC UA: ABNORMAL /HPF (ref 0–5)
SPECIFIC GRAVITY UA: 1.03 (ref 1–1.03)
URINE REFLEX TO CULTURE: YES
UROBILINOGEN, URINE: 1 E.U./DL
WBC UA: ABNORMAL /HPF (ref 0–5)

## 2020-03-10 PROCEDURE — 6360000002 HC RX W HCPCS: Performed by: NURSE PRACTITIONER

## 2020-03-10 PROCEDURE — 2580000003 HC RX 258

## 2020-03-10 PROCEDURE — 81001 URINALYSIS AUTO W/SCOPE: CPT

## 2020-03-10 PROCEDURE — 6370000000 HC RX 637 (ALT 250 FOR IP): Performed by: NURSE PRACTITIONER

## 2020-03-10 PROCEDURE — 87591 N.GONORRHOEAE DNA AMP PROB: CPT

## 2020-03-10 PROCEDURE — 96372 THER/PROPH/DIAG INJ SC/IM: CPT

## 2020-03-10 PROCEDURE — 99283 EMERGENCY DEPT VISIT LOW MDM: CPT

## 2020-03-10 PROCEDURE — 87086 URINE CULTURE/COLONY COUNT: CPT

## 2020-03-10 PROCEDURE — 87491 CHLMYD TRACH DNA AMP PROBE: CPT

## 2020-03-10 RX ORDER — METRONIDAZOLE 500 MG/1
2000 TABLET ORAL ONCE
Status: COMPLETED | OUTPATIENT
Start: 2020-03-10 | End: 2020-03-10

## 2020-03-10 RX ORDER — AZITHROMYCIN 500 MG/1
1000 TABLET, FILM COATED ORAL ONCE
Status: COMPLETED | OUTPATIENT
Start: 2020-03-10 | End: 2020-03-10

## 2020-03-10 RX ORDER — CEFTRIAXONE 500 MG/1
500 INJECTION, POWDER, FOR SOLUTION INTRAMUSCULAR; INTRAVENOUS ONCE
Status: COMPLETED | OUTPATIENT
Start: 2020-03-10 | End: 2020-03-10

## 2020-03-10 RX ORDER — FLUCONAZOLE 100 MG/1
200 TABLET ORAL ONCE
Status: COMPLETED | OUTPATIENT
Start: 2020-03-10 | End: 2020-03-10

## 2020-03-10 RX ADMIN — METRONIDAZOLE 2000 MG: 500 TABLET, FILM COATED ORAL at 01:24

## 2020-03-10 RX ADMIN — CEFTRIAXONE SODIUM 500 MG: 500 INJECTION, POWDER, FOR SOLUTION INTRAMUSCULAR; INTRAVENOUS at 01:24

## 2020-03-10 RX ADMIN — AZITHROMYCIN MONOHYDRATE 1000 MG: 500 TABLET ORAL at 01:24

## 2020-03-10 RX ADMIN — WATER 1 ML: 1 INJECTION INTRAMUSCULAR; INTRAVENOUS; SUBCUTANEOUS at 01:31

## 2020-03-10 RX ADMIN — FLUCONAZOLE 200 MG: 100 TABLET ORAL at 01:24

## 2020-03-10 ASSESSMENT — ENCOUNTER SYMPTOMS
NAUSEA: 0
DIARRHEA: 0
RHINORRHEA: 0
ABDOMINAL PAIN: 0
WHEEZING: 0
COUGH: 0
COLOR CHANGE: 0
BACK PAIN: 0
SHORTNESS OF BREATH: 0
VOMITING: 0

## 2020-03-10 ASSESSMENT — PAIN SCALES - GENERAL: PAINLEVEL_OUTOF10: 3

## 2020-03-10 ASSESSMENT — PAIN DESCRIPTION - PAIN TYPE: TYPE: ACUTE PAIN

## 2020-03-10 ASSESSMENT — PAIN DESCRIPTION - LOCATION: LOCATION: VAGINA

## 2020-03-10 ASSESSMENT — PAIN DESCRIPTION - DESCRIPTORS: DESCRIPTORS: DISCOMFORT;ITCHING

## 2020-03-10 NOTE — ED PROVIDER NOTES
discharge. Negative for decreased urine volume, difficulty urinating, dysuria, flank pain, frequency, genital sores, hematuria, menstrual problem, pelvic pain, vaginal bleeding and vaginal pain. Musculoskeletal: Negative for arthralgias, back pain and myalgias. Skin: Negative for color change and rash. Neurological: Negative for dizziness, tremors, seizures, syncope, speech difficulty, weakness, light-headedness, numbness and headaches. Except as noted above the remainder of the review of systems was reviewed and negative.        PAST MEDICAL HISTORY     Past Medical History:   Diagnosis Date    Anxiety     Depression     Headache     Pneumonia      Past Surgical History:   Procedure Laterality Date    TYMPANOSTOMY TUBE PLACEMENT       Social History     Socioeconomic History    Marital status: Single     Spouse name: Not on file    Number of children: Not on file    Years of education: Not on file    Highest education level: Not on file   Occupational History    Occupation: employed     Comment: full time   Social Needs    Financial resource strain: Not on file    Food insecurity     Worry: Not on file     Inability: Not on file   [employer] needs     Medical: Not on file     Non-medical: Not on file   Tobacco Use    Smoking status: Current Every Day Smoker     Packs/day: 1.00     Years: 10.00     Pack years: 10.00     Types: Cigarettes    Smokeless tobacco: Never Used   Substance and Sexual Activity    Alcohol use: Yes     Comment: occasional    Drug use: Yes     Frequency: 7.0 times per week     Types: Marijuana     Comment: daily    Sexual activity: Never   Lifestyle    Physical activity     Days per week: Not on file     Minutes per session: Not on file    Stress: Not on file   Relationships    Social connections     Talks on phone: Not on file     Gets together: Not on file     Attends Anglican service: Not on file     Active member of club or organization: Not on file Attends meetings of clubs or organizations: Not on file     Relationship status: Not on file    Intimate partner violence     Fear of current or ex partner: Not on file     Emotionally abused: Not on file     Physically abused: Not on file     Forced sexual activity: Not on file   Other Topics Concern    Not on file   Social History Narrative    Not on file       SCREENINGS             PHYSICAL EXAM    (up to 7 for level 4, 8 or more for level 5)     ED Triage Vitals [03/10/20 0051]   BP Temp Temp Source Pulse Resp SpO2 Height Weight   131/76 98.2 °F (36.8 °C) Oral 82 18 100 % 5' 2\" (1.575 m) 170 lb (77.1 kg)       Physical Exam  Constitutional:       General: She is not in acute distress. Appearance: Normal appearance. She is normal weight. She is not ill-appearing, toxic-appearing or diaphoretic. Neck:      Musculoskeletal: Normal range of motion. Cardiovascular:      Rate and Rhythm: Normal rate and regular rhythm. Pulses: Normal pulses. Pulmonary:      Effort: Pulmonary effort is normal.   Abdominal:      General: There is no distension. Palpations: Abdomen is soft. Tenderness: There is no abdominal tenderness. Genitourinary:     Comments: The patient has refused a pelvic examination by this provider. She obtained the swabs herself with the female nurse directing assisting after appropriate explanation and instruction on the appropriate way in which to obtain the swabs. Patient was agreeable self swab would not allow for provider evaluation examination. Musculoskeletal: Normal range of motion. General: No tenderness or signs of injury. Skin:     General: Skin is warm and dry. Capillary Refill: Capillary refill takes less than 2 seconds. Neurological:      General: No focal deficit present. Mental Status: She is alert and oriented to person, place, and time. Mental status is at baseline. Motor: No weakness.       Coordination: Coordination normal.

## 2020-03-10 NOTE — ED NOTES
Patient given discharge instructions and verbalized understanding. She doesn't have any questions at this time. She is aware to follow up with GYN.       Jacky Lu RN  03/10/20 2839

## 2020-03-10 NOTE — PROGRESS NOTES
Patient seen in ED, has no OBGYN provider on file. Call and check if needs follow up for   Vaginal Irritation (Primary)   Vaginal discharge; Exposure to STD      and to schedule with our office.

## 2020-03-11 LAB — URINE CULTURE, ROUTINE: NORMAL

## 2020-03-13 LAB
C TRACH DNA GENITAL QL NAA+PROBE: NEGATIVE
N. GONORRHOEAE DNA: NEGATIVE

## 2020-06-22 ENCOUNTER — HOSPITAL ENCOUNTER (EMERGENCY)
Age: 30
Discharge: HOME | End: 2020-06-22
Payer: MEDICARE

## 2020-06-22 VITALS
DIASTOLIC BLOOD PRESSURE: 86 MMHG | OXYGEN SATURATION: 100 % | RESPIRATION RATE: 18 BRPM | WEIGHT: 160 LBS | BODY MASS INDEX: 29.44 KG/M2 | HEART RATE: 75 BPM | SYSTOLIC BLOOD PRESSURE: 109 MMHG | HEIGHT: 62 IN | TEMPERATURE: 98.3 F

## 2020-06-22 LAB
ABO/RH: NORMAL
ALBUMIN SERPL-MCNC: 4.4 G/DL (ref 3.5–4.6)
ALP BLD-CCNC: 66 U/L (ref 40–130)
ALT SERPL-CCNC: 17 U/L (ref 0–33)
ANION GAP SERPL CALCULATED.3IONS-SCNC: 12 MEQ/L (ref 9–15)
ANISOCYTOSIS: ABNORMAL
AST SERPL-CCNC: 22 U/L (ref 0–35)
BACTERIA: ABNORMAL /HPF
BASOPHILS ABSOLUTE: 0.1 K/UL (ref 0–0.2)
BASOPHILS RELATIVE PERCENT: 0.7 %
BILIRUB SERPL-MCNC: 0.7 MG/DL (ref 0.2–0.7)
BILIRUBIN URINE: NEGATIVE
BLOOD, URINE: NEGATIVE
BUN BLDV-MCNC: 16 MG/DL (ref 6–20)
CALCIUM SERPL-MCNC: 9.1 MG/DL (ref 8.5–9.9)
CHLORIDE BLD-SCNC: 102 MEQ/L (ref 95–107)
CLARITY: CLEAR
CO2: 22 MEQ/L (ref 20–31)
COLOR: YELLOW
CREAT SERPL-MCNC: 0.86 MG/DL (ref 0.5–0.9)
EOSINOPHILS ABSOLUTE: 0.1 K/UL (ref 0–0.7)
EOSINOPHILS RELATIVE PERCENT: 0.6 %
EPITHELIAL CELLS, UA: ABNORMAL /HPF (ref 0–5)
GFR AFRICAN AMERICAN: >60
GFR NON-AFRICAN AMERICAN: >60
GLOBULIN: 2.8 G/DL (ref 2.3–3.5)
GLUCOSE BLD-MCNC: 96 MG/DL (ref 70–99)
GLUCOSE URINE: NEGATIVE MG/DL
GONADOTROPIN, CHORIONIC (HCG) QUANT: 0.9 MIU/ML
HCG, URINE, POC: NEGATIVE
HCT VFR BLD CALC: 41 % (ref 37–47)
HEMOGLOBIN: 13.2 G/DL (ref 12–16)
HYALINE CASTS: ABNORMAL /HPF (ref 0–5)
HYPOCHROMIA: ABNORMAL
KETONES, URINE: ABNORMAL MG/DL
LEUKOCYTE ESTERASE, URINE: ABNORMAL
LYMPHOCYTES ABSOLUTE: 2.8 K/UL (ref 1–4.8)
LYMPHOCYTES RELATIVE PERCENT: 33.7 %
Lab: NORMAL
MCH RBC QN AUTO: 23.5 PG (ref 27–31.3)
MCHC RBC AUTO-ENTMCNC: 32.2 % (ref 33–37)
MCV RBC AUTO: 73.1 FL (ref 82–100)
MICROCYTES: ABNORMAL
MONOCYTES ABSOLUTE: 0.7 K/UL (ref 0.2–0.8)
MONOCYTES RELATIVE PERCENT: 8 %
NEGATIVE QC PASS/FAIL: NORMAL
NEUTROPHILS ABSOLUTE: 4.8 K/UL (ref 1.4–6.5)
NEUTROPHILS RELATIVE PERCENT: 57 %
NITRITE, URINE: NEGATIVE
PDW BLD-RTO: 16.7 % (ref 11.5–14.5)
PH UA: 6.5 (ref 5–9)
PLATELET # BLD: 273 K/UL (ref 130–400)
PLATELET SLIDE REVIEW: NORMAL
POIKILOCYTES: ABNORMAL
POSITIVE QC PASS/FAIL: NORMAL
POTASSIUM SERPL-SCNC: 3.8 MEQ/L (ref 3.4–4.9)
PROTEIN UA: ABNORMAL MG/DL
RBC # BLD: 5.61 M/UL (ref 4.2–5.4)
RBC UA: ABNORMAL /HPF (ref 0–5)
SLIDE REVIEW: ABNORMAL
SODIUM BLD-SCNC: 136 MEQ/L (ref 135–144)
SPECIFIC GRAVITY UA: 1.03 (ref 1–1.03)
TARGET CELLS: ABNORMAL
TOTAL PROTEIN: 7.2 G/DL (ref 6.3–8)
URINE REFLEX TO CULTURE: ABNORMAL
UROBILINOGEN, URINE: 1 E.U./DL
WBC # BLD: 8.4 K/UL (ref 4.8–10.8)
WBC UA: ABNORMAL /HPF (ref 0–5)

## 2020-06-22 PROCEDURE — 84702 CHORIONIC GONADOTROPIN TEST: CPT

## 2020-06-22 PROCEDURE — 86901 BLOOD TYPING SEROLOGIC RH(D): CPT

## 2020-06-22 PROCEDURE — 96372 THER/PROPH/DIAG INJ SC/IM: CPT

## 2020-06-22 PROCEDURE — 36415 COLL VENOUS BLD VENIPUNCTURE: CPT

## 2020-06-22 PROCEDURE — 86900 BLOOD TYPING SEROLOGIC ABO: CPT

## 2020-06-22 PROCEDURE — 81001 URINALYSIS AUTO W/SCOPE: CPT

## 2020-06-22 PROCEDURE — 80053 COMPREHEN METABOLIC PANEL: CPT

## 2020-06-22 PROCEDURE — 85025 COMPLETE CBC W/AUTO DIFF WBC: CPT

## 2020-06-22 PROCEDURE — 6360000002 HC RX W HCPCS: Performed by: PHYSICIAN ASSISTANT

## 2020-06-22 PROCEDURE — 99283 EMERGENCY DEPT VISIT LOW MDM: CPT

## 2020-06-22 RX ORDER — ETODOLAC 400 MG/1
400 TABLET, FILM COATED ORAL 2 TIMES DAILY
Qty: 14 TABLET | Refills: 0 | Status: SHIPPED | OUTPATIENT
Start: 2020-06-22 | End: 2020-06-22 | Stop reason: SDUPTHER

## 2020-06-22 RX ORDER — ETODOLAC 400 MG/1
400 TABLET, FILM COATED ORAL 2 TIMES DAILY
Qty: 14 TABLET | Refills: 0 | Status: SHIPPED | OUTPATIENT
Start: 2020-06-22 | End: 2020-08-17

## 2020-06-22 RX ORDER — KETOROLAC TROMETHAMINE 30 MG/ML
60 INJECTION, SOLUTION INTRAMUSCULAR; INTRAVENOUS ONCE
Status: COMPLETED | OUTPATIENT
Start: 2020-06-22 | End: 2020-06-22

## 2020-06-22 RX ADMIN — KETOROLAC TROMETHAMINE 60 MG: 30 INJECTION, SOLUTION INTRAMUSCULAR at 20:27

## 2020-06-22 ASSESSMENT — ENCOUNTER SYMPTOMS
APNEA: 0
NAUSEA: 1
VOMITING: 1
EYE PAIN: 0
TROUBLE SWALLOWING: 0
ALLERGIC/IMMUNOLOGIC NEGATIVE: 1
COLOR CHANGE: 0
SHORTNESS OF BREATH: 0
ABDOMINAL PAIN: 1

## 2020-06-22 ASSESSMENT — PAIN DESCRIPTION - PAIN TYPE: TYPE: ACUTE PAIN

## 2020-06-22 ASSESSMENT — PAIN SCALES - GENERAL
PAINLEVEL_OUTOF10: 9
PAINLEVEL_OUTOF10: 8

## 2020-06-22 ASSESSMENT — PAIN DESCRIPTION - DESCRIPTORS: DESCRIPTORS: CRAMPING

## 2020-06-22 ASSESSMENT — PAIN DESCRIPTION - LOCATION: LOCATION: ABDOMEN

## 2020-06-22 NOTE — ED PROVIDER NOTES
Musculoskeletal: Normal range of motion. Skin:     General: Skin is warm and dry. Findings: No erythema or rash. Neurological:      Mental Status: She is alert and oriented to person, place, and time. Cranial Nerves: No cranial nerve deficit. Motor: No abnormal muscle tone. Psychiatric:         Behavior: Behavior normal.         Thought Content: Thought content normal.         Judgment: Judgment normal.           DIAGNOSTIC RESULTS     RADIOLOGY:   Non-plain film images such as CT, Ultrasound and MRI are read by the radiologist. Plain radiographic images are visualized and preliminarilyinterpreted by Dee Dee Hernandez PA-C with the below findings:      Interpretation per the Radiologist below, if available at the time of this note:    No orders to display       LABS:  Labs Reviewed   CBC WITH AUTO DIFFERENTIAL - Abnormal; Notable for the following components:       Result Value    RBC 5.61 (*)     MCV 73.1 (*)     MCH 23.5 (*)     MCHC 32.2 (*)     RDW 16.7 (*)     All other components within normal limits   URINE RT REFLEX TO CULTURE - Abnormal; Notable for the following components:    Ketones, Urine TRACE (*)     Protein, UA TRACE (*)     Leukocyte Esterase, Urine TRACE (*)     All other components within normal limits   MICROSCOPIC URINALYSIS - Abnormal; Notable for the following components:    Bacteria, UA FEW (*)     WBC, UA 6-9 (*)     RBC, UA 6-10 (*)     All other components within normal limits   COMPREHENSIVE METABOLIC PANEL   HCG, QUANTITATIVE, PREGNANCY   POC PREGNANCY UR-QUAL   ABO/RH       All other labs were within normal range or not returnedas of this dictation.     EMERGENCYDEPARTMENT COURSE and DIFFERENTIAL DIAGNOSIS/MDM:   Vitals:    Vitals:    06/22/20 1850 06/22/20 2032   BP: 120/79 109/86   Pulse: 104 75   Resp: 18 18   Temp: 98.3 °F (36.8 °C)    TempSrc: Oral    SpO2: 98% 100%   Weight: 160 lb (72.6 kg)    Height: 5' 2\" (1.575 m)        REASSESSMENT      Patient presented the emergency department with complaints of vaginal bleeding associated with lower abdominal pain and concerns of possible miscarriage. Patient's pregnancy tests are negative. Laboratory testing is unremarkable otherwise. Discussed possible etiology and need for follow-up with her gynecologist.  Return for any worsening symptoms    MDM    PROCEDURES:    Procedures      FINAL IMPRESSION      1. Vaginal bleeding    2. Lower abdominal pain          DISPOSITION/PLAN   DISPOSITION Decision To Discharge 06/22/2020 08:33:29 PM      PATIENT REFERRED TO:  [address] [place] [address].   [address] [place] [address]  [phone]    Call in 2 days        DISCHARGE MEDICATIONS:  Discharge Medication List as of 6/22/2020  8:18 PM      START taking these medications    Details   etodolac (LODINE) 400 MG tablet Take 1 tablet by mouth 2 times daily For pain, Disp-14 tablet, R-0Print             (Please note that portions of this note were completed with a voice recognition program.  Efforts were made to edit the dictations but occasionally words are mis-transcribed.)    STEPHEN Frederick PA-C  06/22/20 8032

## 2020-06-22 NOTE — ED TRIAGE NOTES
Pt comes to er with c/o vaginal bleeding and  Passing a large mass. Pt states that her last period was  Sometime in April or may. Pt did not take pregnancy test at home because her periods are irregular anyway.  Pt also c/o abdominal pain and cramping at this time

## 2020-08-03 ENCOUNTER — HOSPITAL ENCOUNTER (EMERGENCY)
Age: 30
Discharge: HOME | End: 2020-08-03
Payer: MEDICARE

## 2020-08-03 ENCOUNTER — APPOINTMENT (OUTPATIENT)
Dept: CT IMAGING | Age: 30
End: 2020-08-03
Payer: MEDICARE

## 2020-08-03 VITALS
SYSTOLIC BLOOD PRESSURE: 94 MMHG | HEIGHT: 62 IN | WEIGHT: 160 LBS | RESPIRATION RATE: 16 BRPM | BODY MASS INDEX: 29.44 KG/M2 | DIASTOLIC BLOOD PRESSURE: 68 MMHG | TEMPERATURE: 98.1 F | HEART RATE: 55 BPM | OXYGEN SATURATION: 99 %

## 2020-08-03 LAB
ALBUMIN SERPL-MCNC: 3.9 G/DL (ref 3.5–4.6)
ALP BLD-CCNC: 60 U/L (ref 40–130)
ALT SERPL-CCNC: 11 U/L (ref 0–33)
ANION GAP SERPL CALCULATED.3IONS-SCNC: 13 MEQ/L (ref 9–15)
AST SERPL-CCNC: 24 U/L (ref 0–35)
BASOPHILS ABSOLUTE: 0.1 K/UL (ref 0–0.2)
BASOPHILS RELATIVE PERCENT: 1.2 %
BILIRUB SERPL-MCNC: 0.6 MG/DL (ref 0.2–0.7)
BILIRUBIN URINE: NEGATIVE
BLOOD, URINE: NEGATIVE
BUN BLDV-MCNC: 15 MG/DL (ref 6–20)
CALCIUM SERPL-MCNC: 8.9 MG/DL (ref 8.5–9.9)
CHLORIDE BLD-SCNC: 106 MEQ/L (ref 95–107)
CLARITY: CLEAR
CO2: 21 MEQ/L (ref 20–31)
COLOR: YELLOW
CREAT SERPL-MCNC: 0.81 MG/DL (ref 0.5–0.9)
EOSINOPHILS ABSOLUTE: 0.1 K/UL (ref 0–0.7)
EOSINOPHILS RELATIVE PERCENT: 1.3 %
GFR AFRICAN AMERICAN: >60
GFR NON-AFRICAN AMERICAN: >60
GLOBULIN: 2.7 G/DL (ref 2.3–3.5)
GLUCOSE BLD-MCNC: 98 MG/DL (ref 70–99)
GLUCOSE URINE: NEGATIVE MG/DL
HCG, URINE, POC: NEGATIVE
HCT VFR BLD CALC: 39.9 % (ref 37–47)
HEMOGLOBIN: 12.9 G/DL (ref 12–16)
HYPOCHROMIA: ABNORMAL
KETONES, URINE: NEGATIVE MG/DL
LEUKOCYTE ESTERASE, URINE: NEGATIVE
LIPASE: 26 U/L (ref 12–95)
LYMPHOCYTES ABSOLUTE: 3.6 K/UL (ref 1–4.8)
LYMPHOCYTES RELATIVE PERCENT: 36.3 %
Lab: 0
MCH RBC QN AUTO: 23.4 PG (ref 27–31.3)
MCHC RBC AUTO-ENTMCNC: 32.4 % (ref 33–37)
MCV RBC AUTO: 72.2 FL (ref 82–100)
MICROCYTES: ABNORMAL
MONOCYTES ABSOLUTE: 0.5 K/UL (ref 0.2–0.8)
MONOCYTES RELATIVE PERCENT: 4.9 %
NEGATIVE QC PASS/FAIL: NORMAL
NEUTROPHILS ABSOLUTE: 5.7 K/UL (ref 1.4–6.5)
NEUTROPHILS RELATIVE PERCENT: 56.3 %
NITRITE, URINE: NEGATIVE
PDW BLD-RTO: 16.8 % (ref 11.5–14.5)
PH UA: 5 (ref 5–9)
PLATELET # BLD: 299 K/UL (ref 130–400)
POC CREATININE WHOLE BLOOD: 0.8
POSITIVE QC PASS/FAIL: NORMAL
POTASSIUM SERPL-SCNC: 4.9 MEQ/L (ref 3.4–4.9)
PROTEIN UA: ABNORMAL MG/DL
RBC # BLD: 5.53 M/UL (ref 4.2–5.4)
SODIUM BLD-SCNC: 140 MEQ/L (ref 135–144)
SPECIFIC GRAVITY UA: >=1.099 (ref 1–1.03)
TOTAL PROTEIN: 6.6 G/DL (ref 6.3–8)
URINE REFLEX TO CULTURE: ABNORMAL
UROBILINOGEN, URINE: 1 E.U./DL
WBC # BLD: 10 K/UL (ref 4.8–10.8)

## 2020-08-03 PROCEDURE — 99284 EMERGENCY DEPT VISIT MOD MDM: CPT

## 2020-08-03 PROCEDURE — 81003 URINALYSIS AUTO W/O SCOPE: CPT

## 2020-08-03 PROCEDURE — 96375 TX/PRO/DX INJ NEW DRUG ADDON: CPT

## 2020-08-03 PROCEDURE — 83690 ASSAY OF LIPASE: CPT

## 2020-08-03 PROCEDURE — 36415 COLL VENOUS BLD VENIPUNCTURE: CPT

## 2020-08-03 PROCEDURE — 80053 COMPREHEN METABOLIC PANEL: CPT

## 2020-08-03 PROCEDURE — 2580000003 HC RX 258: Performed by: PHYSICIAN ASSISTANT

## 2020-08-03 PROCEDURE — 6360000004 HC RX CONTRAST MEDICATION: Performed by: PHYSICIAN ASSISTANT

## 2020-08-03 PROCEDURE — 96374 THER/PROPH/DIAG INJ IV PUSH: CPT

## 2020-08-03 PROCEDURE — 85025 COMPLETE CBC W/AUTO DIFF WBC: CPT

## 2020-08-03 PROCEDURE — 6360000002 HC RX W HCPCS: Performed by: PHYSICIAN ASSISTANT

## 2020-08-03 PROCEDURE — 74177 CT ABD & PELVIS W/CONTRAST: CPT

## 2020-08-03 RX ORDER — METOCLOPRAMIDE HYDROCHLORIDE 5 MG/ML
10 INJECTION INTRAMUSCULAR; INTRAVENOUS ONCE
Status: COMPLETED | OUTPATIENT
Start: 2020-08-03 | End: 2020-08-03

## 2020-08-03 RX ORDER — LORAZEPAM 2 MG/ML
1 INJECTION INTRAMUSCULAR ONCE
Status: COMPLETED | OUTPATIENT
Start: 2020-08-03 | End: 2020-08-03

## 2020-08-03 RX ORDER — ONDANSETRON 2 MG/ML
4 INJECTION INTRAMUSCULAR; INTRAVENOUS ONCE
Status: COMPLETED | OUTPATIENT
Start: 2020-08-03 | End: 2020-08-03

## 2020-08-03 RX ORDER — KETOROLAC TROMETHAMINE 30 MG/ML
30 INJECTION, SOLUTION INTRAMUSCULAR; INTRAVENOUS ONCE
Status: COMPLETED | OUTPATIENT
Start: 2020-08-03 | End: 2020-08-03

## 2020-08-03 RX ORDER — ONDANSETRON 4 MG/1
4-8 TABLET, ORALLY DISINTEGRATING ORAL EVERY 12 HOURS PRN
Qty: 12 TABLET | Refills: 0 | Status: SHIPPED | OUTPATIENT
Start: 2020-08-03 | End: 2020-08-17

## 2020-08-03 RX ORDER — 0.9 % SODIUM CHLORIDE 0.9 %
1000 INTRAVENOUS SOLUTION INTRAVENOUS ONCE
Status: COMPLETED | OUTPATIENT
Start: 2020-08-03 | End: 2020-08-03

## 2020-08-03 RX ADMIN — KETOROLAC TROMETHAMINE 30 MG: 30 INJECTION, SOLUTION INTRAMUSCULAR at 10:13

## 2020-08-03 RX ADMIN — IOPAMIDOL 100 ML: 612 INJECTION, SOLUTION INTRAVENOUS at 10:00

## 2020-08-03 RX ADMIN — LORAZEPAM 1 MG: 2 INJECTION INTRAMUSCULAR; INTRAVENOUS at 12:43

## 2020-08-03 RX ADMIN — SODIUM CHLORIDE 1000 ML: 9 INJECTION, SOLUTION INTRAVENOUS at 10:13

## 2020-08-03 RX ADMIN — METOCLOPRAMIDE 10 MG: 5 INJECTION, SOLUTION INTRAMUSCULAR; INTRAVENOUS at 11:49

## 2020-08-03 RX ADMIN — ONDANSETRON 4 MG: 2 INJECTION INTRAMUSCULAR; INTRAVENOUS at 10:13

## 2020-08-03 ASSESSMENT — ENCOUNTER SYMPTOMS
PHOTOPHOBIA: 0
RHINORRHEA: 0
SORE THROAT: 0
COUGH: 0
NAUSEA: 1
SHORTNESS OF BREATH: 0
BACK PAIN: 0
EYE PAIN: 0
ABDOMINAL PAIN: 1
DIARRHEA: 0
VOMITING: 1

## 2020-08-03 ASSESSMENT — PAIN SCALES - GENERAL
PAINLEVEL_OUTOF10: 8
PAINLEVEL_OUTOF10: 8

## 2020-08-03 ASSESSMENT — PAIN DESCRIPTION - LOCATION: LOCATION: ABDOMEN

## 2020-08-03 ASSESSMENT — PAIN DESCRIPTION - PAIN TYPE: TYPE: ACUTE PAIN

## 2020-08-03 NOTE — ED NOTES
Pt in room awake reports that she is just feels \"shakey inside\" pt appears to be calm alert      Eve Resendiz RN  08/03/20 9664

## 2020-08-03 NOTE — ED NOTES
Pt In room asleep awoke to check IV fluids pt has no complaints at this time      Dale Wayne RN  08/03/20 3097

## 2020-08-03 NOTE — ED PROVIDER NOTES
[address] [facility] ED  EMERGENCY DEPARTMENT ENCOUNTER      Pt Name: Lois Tolbert  MRN: [id number]  [place] 1990  Date of evaluation: 8/3/2020  Provider: Amberly Calvillo PA-C      HISTORY OF PRESENT ILLNESS    Rock Britany Wilson is a 31 y.o. female who presents to the Emergency Department with abdominal pain, rlq  Pain is constant and cramping. With associated nausa and vomiting for 3 days. She tried pepto bismol with no relief. Denies diarrhea fever cough. REVIEW OF SYSTEMS       Review of Systems   Constitutional: Negative for chills, diaphoresis, fatigue and fever. HENT: Negative for congestion, rhinorrhea and sore throat. Eyes: Negative for photophobia and pain. Respiratory: Negative for cough and shortness of breath. Cardiovascular: Negative for chest pain and palpitations. Gastrointestinal: Positive for abdominal pain, nausea and vomiting. Negative for diarrhea. Genitourinary: Negative for dysuria and flank pain. Musculoskeletal: Negative for back pain. Skin: Negative for rash. Neurological: Negative for dizziness, light-headedness and headaches. Psychiatric/Behavioral: Negative. All other systems reviewed and are negative.         PAST MEDICAL HISTORY     Past Medical History:   Diagnosis Date    Anxiety     Depression     Headache     Pneumonia          SURGICAL HISTORY       Past Surgical History:   Procedure Laterality Date    TYMPANOSTOMY TUBE PLACEMENT           CURRENT MEDICATIONS       Previous Medications    DIVALPROEX (DEPAKOTE) 250 MG DR TABLET    Take 1 tablet by mouth 3 times daily    ETODOLAC (LODINE) 400 MG TABLET    Take 1 tablet by mouth 2 times daily For pain    MELATONIN 1 MG TABLET    Take 5 tablets by mouth nightly    QUETIAPINE (SEROQUEL) 200 MG TABLET    Take 1 tablet by mouth nightly    QUETIAPINE (SEROQUEL) 25 MG TABLET    Take 1 tablet by mouth 2 times daily (with meals)       ALLERGIES     Reglan [metoclopramide]    FAMILY HISTORY       Family History   Problem Relation Age of Onset    Depression Mother           SOCIAL HISTORY       Social History     Socioeconomic History    Marital status: Single     Spouse name: None    Number of children: None    Years of education: None    Highest education level: None   Occupational History    Occupation: employed     Comment: full time   Social Needs    Financial resource strain: None    Food insecurity     Worry: None     Inability: None    Transportation needs     Medical: None     Non-medical: None   Tobacco Use    Smoking status: Current Every Day Smoker     Packs/day: 1.00     Years: 10.00     Pack years: 10.00     Types: Cigarettes    Smokeless tobacco: Never Used   Substance and Sexual Activity    Alcohol use: Yes     Comment: occasional    Drug use: Yes     Frequency: 7.0 times per week     Types: Marijuana     Comment: daily    Sexual activity: Never   Lifestyle    Physical activity     Days per week: None     Minutes per session: None    Stress: None   Relationships    Social connections     Talks on phone: None     Gets together: None     Attends Hoahaoism service: None     Active member of club or organization: None     Attends meetings of clubs or organizations: None     Relationship status: None    Intimate partner violence     Fear of current or ex partner: None     Emotionally abused: None     Physically abused: None     Forced sexual activity: None   Other Topics Concern    None   Social History Narrative    None       SCREENINGS             PHYSICAL EXAM    (up to 7 for level 4, 8 or more for level 5)     ED Triage Vitals [08/03/20 0946]   BP Temp Temp Source Pulse Resp SpO2 Height Weight   118/83 98.1 °F (36.7 °C) Oral 72 16 100 % 5' 2\" (1.575 m) 160 lb (72.6 kg)       Physical Exam  Vitals signs and nursing note reviewed. Constitutional:       General: She is not in acute distress. Appearance: Normal appearance. She is well-developed.  She is not diaphoretic. HENT:      Head: Normocephalic and atraumatic. Eyes:      General: Lids are normal.      Conjunctiva/sclera: Conjunctivae normal.   Neck:      Musculoskeletal: Normal range of motion and neck supple. Cardiovascular:      Rate and Rhythm: Normal rate and regular rhythm. Pulses: Normal pulses. Heart sounds: Normal heart sounds. Pulmonary:      Effort: Pulmonary effort is normal.      Breath sounds: Normal breath sounds. Abdominal:      General: Bowel sounds are normal.      Palpations: Abdomen is soft. Tenderness: There is abdominal tenderness in the right lower quadrant. Lymphadenopathy:      Cervical: No cervical adenopathy. Skin:     General: Skin is warm and dry. Capillary Refill: Capillary refill takes less than 2 seconds. Findings: No rash. Neurological:      Mental Status: She is alert and oriented to person, place, and time. Psychiatric:         Thought Content: Thought content normal.         Judgment: Judgment normal.           All other labs were within normal range or not returned as of this dictation. EMERGENCY DEPARTMENT COURSE and DIFFERENTIALDIAGNOSIS/MDM:   Vitals:    Vitals:    08/03/20 0946 08/03/20 1244   BP: 118/83 94/68   Pulse: 72 55   Resp: 16 16   Temp: 98.1 °F (36.7 °C)    TempSrc: Oral    SpO2: 100% 99%   Weight: 160 lb (72.6 kg)    Height: 5' 2\" (1.575 m)             Pt is nontoxic and no acute distress. Labs grossly unremarkable. Resting comfortably in cart. No emesis while in the ED. Given reglan and had anxiety reaction to it so givenativant with resolution. Not pregnant, irregular menses advised to see obgyn. Ct shows no acute pathology, possible small adnexal cyst. advbised f/u wqith pcp or obgyn in a few days and return to ed for new or worsening symptoms. Pt verbalixed understanding. Stable andready for d/c       PROCEDURES:  Unless otherwise noted below, none     Procedures      FINAL IMPRESSION      1. Nausea    2.

## 2020-08-17 ENCOUNTER — OFFICE VISIT (OUTPATIENT)
Dept: OBGYN CLINIC | Age: 30
End: 2020-08-17
Payer: MEDICARE

## 2020-08-17 VITALS
BODY MASS INDEX: 29.26 KG/M2 | DIASTOLIC BLOOD PRESSURE: 72 MMHG | SYSTOLIC BLOOD PRESSURE: 118 MMHG | HEIGHT: 62 IN | WEIGHT: 159 LBS

## 2020-08-17 PROCEDURE — G8427 DOCREV CUR MEDS BY ELIG CLIN: HCPCS | Performed by: OBSTETRICS & GYNECOLOGY

## 2020-08-17 PROCEDURE — 99203 OFFICE O/P NEW LOW 30 MIN: CPT | Performed by: OBSTETRICS & GYNECOLOGY

## 2020-08-17 PROCEDURE — G8419 CALC BMI OUT NRM PARAM NOF/U: HCPCS | Performed by: OBSTETRICS & GYNECOLOGY

## 2020-08-17 PROCEDURE — 4004F PT TOBACCO SCREEN RCVD TLK: CPT | Performed by: OBSTETRICS & GYNECOLOGY

## 2020-08-17 NOTE — PROGRESS NOTES
Patient here for ER F/U. New patient; reviewed medical, surgical, social and family history. Also reviewed current medications and allergies. Seen in ER for c/o lower abdominal pain. CT completely negative. States she was having nausea and hot flashes at same time. + emesis. States sx now resolved. CT with bilateral physiologic cysts only. Discussed and reassured. States she has irregular cycles ( 3-4 per year ), but has been seen by other clinics and w/u all negative. Discussed re-evaluation when fertility is an issue. Declines regulation with meds at this time. All questions answered. F/U annual exam.  Pt was seen with total face to face time of 30 minutes with more than 50% of the visit being counseling and education regarding encounter dx of probable resolved gastritis and irregular cycles. See discussion /counseling details as stated above. Vitals:  /72   Ht 5' 2\" (1.575 m)   Wt 159 lb (72.1 kg)   BMI 29.08 kg/m²   Past Medical History:   Diagnosis Date    Anxiety     Depression     Headache     Pneumonia      Past Surgical History:   Procedure Laterality Date    TYMPANOSTOMY TUBE PLACEMENT       Allergies:  Reglan [metoclopramide]  Current Outpatient Medications   Medication Sig Dispense Refill    NAPROXEN PO Take by mouth       No current facility-administered medications for this visit.       Social History     Socioeconomic History    Marital status: Single     Spouse name: Not on file    Number of children: Not on file    Years of education: Not on file    Highest education level: Not on file   Occupational History    Occupation: employed     Comment: full time   Social Needs    Financial resource strain: Not on file    Food insecurity     Worry: Not on file     Inability: Not on file   [employer] needs     Medical: Not on file     Non-medical: Not on file   Tobacco Use    Smoking status: Current Every Day Smoker     Packs/day: 1.00     Years: 10.00 Pack years: 10.00     Types: Cigarettes    Smokeless tobacco: Never Used   Substance and Sexual Activity    Alcohol use: Yes     Comment: occasional    Drug use: Yes     Frequency: 7.0 times per week     Types: Marijuana     Comment: daily    Sexual activity: Never   Lifestyle    Physical activity     Days per week: Not on file     Minutes per session: Not on file    Stress: Not on file   Relationships    Social connections     Talks on phone: Not on file     Gets together: Not on file     Attends Christian service: Not on file     Active member of club or organization: Not on file     Attends meetings of clubs or organizations: Not on file     Relationship status: Not on file    Intimate partner violence     Fear of current or ex partner: Not on file     Emotionally abused: Not on file     Physically abused: Not on file     Forced sexual activity: Not on file   Other Topics Concern    Not on file   Social History Narrative    Not on file        Family History   Problem Relation Age of Onset    Depression Mother        Review of Systems   Constitutional: Negative. Negative for activity change, appetite change, chills, diaphoresis, fatigue, fever and unexpected weight change. HENT: Negative. Eyes: Negative. Respiratory: Negative. Cardiovascular: Negative. Gastrointestinal: Negative for abdominal distention, abdominal pain, anal bleeding, blood in stool, constipation, diarrhea, nausea, rectal pain and vomiting. Endocrine: Negative. Genitourinary: Positive for menstrual problem (Oligomenorrhea). Negative for decreased urine volume, difficulty urinating, dyspareunia, dysuria, enuresis, flank pain, frequency, genital sores, hematuria, pelvic pain, urgency, vaginal bleeding, vaginal discharge and vaginal pain. Musculoskeletal: Negative. Skin: Negative. Allergic/Immunologic: Negative. Neurological: Negative. Hematological: Negative. Psychiatric/Behavioral: Negative. Objective:     Physical Exam  Constitutional:       General: She is not in acute distress. Appearance: She is well-developed. She is not diaphoretic. HENT:      Head: Normocephalic and atraumatic. Eyes:      Conjunctiva/sclera: Conjunctivae normal.   Neck:      Musculoskeletal: Normal range of motion and neck supple. Cardiovascular:      Rate and Rhythm: Normal rate and regular rhythm. Pulmonary:      Effort: Pulmonary effort is normal. No respiratory distress. Musculoskeletal: Normal range of motion. General: No tenderness or deformity. Skin:     General: Skin is warm and dry. Coloration: Skin is not pale. Neurological:      Mental Status: She is alert and oriented to person, place, and time. Motor: No abnormal muscle tone. Coordination: Coordination normal.   Psychiatric:         Behavior: Behavior normal.         Thought Content: Thought content normal.         Judgment: Judgment normal.         Assessment:          Diagnosis Orders   1. Irregular periods/menstrual cycles          Plan:      Medications placedthis encounter:  No orders of the defined types were placed in this encounter. Orders placedthis encounter:  No orders of the defined types were placed in this encounter.         Follow up:  Return for Annual.

## 2020-08-18 ASSESSMENT — ENCOUNTER SYMPTOMS
DIARRHEA: 0
ALLERGIC/IMMUNOLOGIC NEGATIVE: 1
VOMITING: 0
NAUSEA: 0
RECTAL PAIN: 0
ANAL BLEEDING: 0
BLOOD IN STOOL: 0
CONSTIPATION: 0
ABDOMINAL DISTENTION: 0
RESPIRATORY NEGATIVE: 1
EYES NEGATIVE: 1
ABDOMINAL PAIN: 0

## 2021-01-12 ENCOUNTER — HOSPITAL ENCOUNTER (EMERGENCY)
Age: 31
Discharge: HOME | End: 2021-01-12
Payer: MEDICARE

## 2021-01-12 ENCOUNTER — APPOINTMENT (OUTPATIENT)
Dept: CT IMAGING | Age: 31
End: 2021-01-12
Payer: MEDICARE

## 2021-01-12 VITALS
TEMPERATURE: 97.6 F | BODY MASS INDEX: 29.44 KG/M2 | RESPIRATION RATE: 16 BRPM | WEIGHT: 160 LBS | SYSTOLIC BLOOD PRESSURE: 118 MMHG | HEART RATE: 50 BPM | HEIGHT: 62 IN | OXYGEN SATURATION: 100 % | DIASTOLIC BLOOD PRESSURE: 72 MMHG

## 2021-01-12 DIAGNOSIS — N83.202 CYST OF LEFT OVARY: Primary | ICD-10-CM

## 2021-01-12 DIAGNOSIS — F41.1 ANXIETY STATE: ICD-10-CM

## 2021-01-12 LAB
ALBUMIN SERPL-MCNC: 3.7 G/DL (ref 3.5–4.6)
ALP BLD-CCNC: 70 U/L (ref 40–130)
ALT SERPL-CCNC: 10 U/L (ref 0–33)
ANION GAP SERPL CALCULATED.3IONS-SCNC: 10 MEQ/L (ref 9–15)
ANISOCYTOSIS: ABNORMAL
AST SERPL-CCNC: 12 U/L (ref 0–35)
BACTERIA: NEGATIVE /HPF
BASOPHILS ABSOLUTE: 0.1 K/UL (ref 0–0.2)
BASOPHILS RELATIVE PERCENT: 0.6 %
BILIRUB SERPL-MCNC: <0.2 MG/DL (ref 0.2–0.7)
BILIRUBIN URINE: NEGATIVE
BLOOD, URINE: NEGATIVE
BUN BLDV-MCNC: 13 MG/DL (ref 6–20)
CALCIUM SERPL-MCNC: 8.9 MG/DL (ref 8.5–9.9)
CHLORIDE BLD-SCNC: 103 MEQ/L (ref 95–107)
CLARITY: CLEAR
CO2: 23 MEQ/L (ref 20–31)
COLOR: YELLOW
CREAT SERPL-MCNC: 1.04 MG/DL (ref 0.5–0.9)
EOSINOPHILS ABSOLUTE: 0.2 K/UL (ref 0–0.7)
EOSINOPHILS RELATIVE PERCENT: 1.7 %
EPITHELIAL CELLS, UA: ABNORMAL /HPF (ref 0–5)
GFR AFRICAN AMERICAN: >60
GFR NON-AFRICAN AMERICAN: >60
GLOBULIN: 2.6 G/DL (ref 2.3–3.5)
GLUCOSE BLD-MCNC: 98 MG/DL (ref 70–99)
GLUCOSE URINE: NEGATIVE MG/DL
HCG, URINE, POC: NEGATIVE
HCT VFR BLD CALC: 38.1 % (ref 37–47)
HEMOGLOBIN: 12.5 G/DL (ref 12–16)
HYALINE CASTS: ABNORMAL /HPF (ref 0–5)
KETONES, URINE: NEGATIVE MG/DL
LEUKOCYTE ESTERASE, URINE: ABNORMAL
LIPASE: 32 U/L (ref 12–95)
LYMPHOCYTES ABSOLUTE: 3.8 K/UL (ref 1–4.8)
LYMPHOCYTES RELATIVE PERCENT: 42.3 %
Lab: NORMAL
MCH RBC QN AUTO: 23.9 PG (ref 27–31.3)
MCHC RBC AUTO-ENTMCNC: 32.9 % (ref 33–37)
MCV RBC AUTO: 72.8 FL (ref 82–100)
MICROCYTES: ABNORMAL
MONOCYTES ABSOLUTE: 0.6 K/UL (ref 0.2–0.8)
MONOCYTES RELATIVE PERCENT: 6.3 %
NEGATIVE QC PASS/FAIL: NORMAL
NEUTROPHILS ABSOLUTE: 4.4 K/UL (ref 1.4–6.5)
NEUTROPHILS RELATIVE PERCENT: 49.1 %
NITRITE, URINE: NEGATIVE
PDW BLD-RTO: 16.1 % (ref 11.5–14.5)
PH UA: 6 (ref 5–9)
PLATELET # BLD: 302 K/UL (ref 130–400)
POSITIVE QC PASS/FAIL: NORMAL
POTASSIUM SERPL-SCNC: 4.1 MEQ/L (ref 3.4–4.9)
PROTEIN UA: NEGATIVE MG/DL
RBC # BLD: 5.24 M/UL (ref 4.2–5.4)
RBC UA: ABNORMAL /HPF (ref 0–5)
SLIDE REVIEW: ABNORMAL
SODIUM BLD-SCNC: 136 MEQ/L (ref 135–144)
SPECIFIC GRAVITY UA: 1.02 (ref 1–1.03)
TOTAL PROTEIN: 6.3 G/DL (ref 6.3–8)
URINE REFLEX TO CULTURE: YES
UROBILINOGEN, URINE: 0.2 E.U./DL
WBC # BLD: 9.1 K/UL (ref 4.8–10.8)
WBC UA: ABNORMAL /HPF (ref 0–5)

## 2021-01-12 PROCEDURE — 6360000002 HC RX W HCPCS: Performed by: NURSE PRACTITIONER

## 2021-01-12 PROCEDURE — 99284 EMERGENCY DEPT VISIT MOD MDM: CPT

## 2021-01-12 PROCEDURE — 6370000000 HC RX 637 (ALT 250 FOR IP): Performed by: NURSE PRACTITIONER

## 2021-01-12 PROCEDURE — 2500000003 HC RX 250 WO HCPCS: Performed by: NURSE PRACTITIONER

## 2021-01-12 PROCEDURE — 81001 URINALYSIS AUTO W/SCOPE: CPT

## 2021-01-12 PROCEDURE — 96375 TX/PRO/DX INJ NEW DRUG ADDON: CPT

## 2021-01-12 PROCEDURE — 96374 THER/PROPH/DIAG INJ IV PUSH: CPT

## 2021-01-12 PROCEDURE — 85025 COMPLETE CBC W/AUTO DIFF WBC: CPT

## 2021-01-12 PROCEDURE — 87086 URINE CULTURE/COLONY COUNT: CPT

## 2021-01-12 PROCEDURE — 80053 COMPREHEN METABOLIC PANEL: CPT

## 2021-01-12 PROCEDURE — 36415 COLL VENOUS BLD VENIPUNCTURE: CPT

## 2021-01-12 PROCEDURE — 2580000003 HC RX 258: Performed by: NURSE PRACTITIONER

## 2021-01-12 PROCEDURE — 6360000004 HC RX CONTRAST MEDICATION: Performed by: NURSE PRACTITIONER

## 2021-01-12 PROCEDURE — 74177 CT ABD & PELVIS W/CONTRAST: CPT

## 2021-01-12 PROCEDURE — 83690 ASSAY OF LIPASE: CPT

## 2021-01-12 RX ORDER — FENTANYL CITRATE 50 UG/ML
50 INJECTION, SOLUTION INTRAMUSCULAR; INTRAVENOUS
Status: DISCONTINUED | OUTPATIENT
Start: 2021-01-12 | End: 2021-01-12 | Stop reason: HOSPADM

## 2021-01-12 RX ORDER — KETOROLAC TROMETHAMINE 30 MG/ML
30 INJECTION, SOLUTION INTRAMUSCULAR; INTRAVENOUS ONCE
Status: COMPLETED | OUTPATIENT
Start: 2021-01-12 | End: 2021-01-12

## 2021-01-12 RX ORDER — QUETIAPINE FUMARATE 200 MG/1
200 TABLET, FILM COATED ORAL DAILY
COMMUNITY

## 2021-01-12 RX ORDER — QUETIAPINE FUMARATE 50 MG/1
50 TABLET, EXTENDED RELEASE ORAL 2 TIMES DAILY
COMMUNITY

## 2021-01-12 RX ORDER — DICYCLOMINE HYDROCHLORIDE 10 MG/1
20 CAPSULE ORAL ONCE
Status: COMPLETED | OUTPATIENT
Start: 2021-01-12 | End: 2021-01-12

## 2021-01-12 RX ORDER — 0.9 % SODIUM CHLORIDE 0.9 %
1000 INTRAVENOUS SOLUTION INTRAVENOUS ONCE
Status: COMPLETED | OUTPATIENT
Start: 2021-01-12 | End: 2021-01-12

## 2021-01-12 RX ORDER — HYDROXYZINE PAMOATE 50 MG/1
50 CAPSULE ORAL 3 TIMES DAILY PRN
COMMUNITY

## 2021-01-12 RX ORDER — ONDANSETRON 2 MG/ML
4 INJECTION INTRAMUSCULAR; INTRAVENOUS ONCE
Status: COMPLETED | OUTPATIENT
Start: 2021-01-12 | End: 2021-01-12

## 2021-01-12 RX ADMIN — FENTANYL CITRATE 50 MCG: 50 INJECTION, SOLUTION INTRAMUSCULAR; INTRAVENOUS at 05:58

## 2021-01-12 RX ADMIN — FAMOTIDINE 20 MG: 10 INJECTION INTRAVENOUS at 04:49

## 2021-01-12 RX ADMIN — SODIUM CHLORIDE 1000 ML: 9 INJECTION, SOLUTION INTRAVENOUS at 04:48

## 2021-01-12 RX ADMIN — KETOROLAC TROMETHAMINE 30 MG: 30 INJECTION, SOLUTION INTRAMUSCULAR at 04:49

## 2021-01-12 RX ADMIN — IOPAMIDOL 100 ML: 612 INJECTION, SOLUTION INTRAVENOUS at 06:27

## 2021-01-12 RX ADMIN — DICYCLOMINE HYDROCHLORIDE 20 MG: 10 CAPSULE ORAL at 05:04

## 2021-01-12 RX ADMIN — ONDANSETRON 4 MG: 2 INJECTION INTRAMUSCULAR; INTRAVENOUS at 04:49

## 2021-01-12 ASSESSMENT — ENCOUNTER SYMPTOMS
COLOR CHANGE: 0
SINUS PRESSURE: 0
ABDOMINAL DISTENTION: 0
NAUSEA: 1
BACK PAIN: 0
SORE THROAT: 0
VOMITING: 0
SHORTNESS OF BREATH: 0
CONSTIPATION: 0
CHEST TIGHTNESS: 0
COUGH: 0
ABDOMINAL PAIN: 1
TROUBLE SWALLOWING: 0
RHINORRHEA: 0
SINUS PAIN: 0
DIARRHEA: 0
WHEEZING: 0

## 2021-01-12 ASSESSMENT — PAIN DESCRIPTION - FREQUENCY: FREQUENCY: CONTINUOUS

## 2021-01-12 ASSESSMENT — PAIN SCALES - GENERAL
PAINLEVEL_OUTOF10: 10

## 2021-01-12 ASSESSMENT — PAIN DESCRIPTION - PAIN TYPE
TYPE: ACUTE PAIN
TYPE: ACUTE PAIN

## 2021-01-12 ASSESSMENT — PAIN DESCRIPTION - LOCATION: LOCATION: ABDOMEN

## 2021-01-12 NOTE — ED NOTES
Patient given DC instructions and education  Iv discontinued  To Fu with PCP   Up with steady gait      Media Session, RN  01/12/21 6455

## 2021-01-12 NOTE — ED TRIAGE NOTES
Arrived to the ER by [facility] for abdominal pain. States pain started around 0230 feeling as though having a sharp spasm across mid to lower abdomen. Denies N/V, diarrhea, or constipation.  Denies CP or SOB

## 2021-01-12 NOTE — ED PROVIDER NOTES
[address] [facility] ED  EMERGENCY DEPARTMENT ENCOUNTER      Pt Name: David Fermin  MRN: [id number]  [place] 1990  Date of evaluation: 1/12/2021  Provider: ASHLEY Stuart CNP    CHIEF COMPLAINT       Chief Complaint   Patient presents with    Abdominal Pain         HISTORY OF PRESENT ILLNESS   (Location/Symptom, Timing/Onset,Context/Setting, Quality, Duration, Modifying Factors, Severity)  Note limiting factors. David Fermin is a 31 y.o. female who presents to the emergency department for complaint of onset of mid abdominal pain that woke her from sleep around 2:30 in the morning. She states that she had no pain or discomfort prior to going to bed she has had no nausea vomiting diarrhea or viral symptoms. She denies any recent fevers chills sweats or other body aches. She reports she was sleeping awoke from sleep with a sharp cramping wavelike pain rated up to a 10 out of 10 in the mid abdomen just above her umbilicus. She states the pain radiates to the low back region. She denies any recent dysuria urgency or frequency denies any vaginal or pelvic pain denies any vaginal bleeding or discharge. She states that her last menstrual cycle ended on the third of this month. She states that the pain is made worse if she lays on her back or if the mid abdomen is pressed on. She denies any flank pain or lower quadrant pain. She states that she has been eating and drinking well throughout the day continues to have normal bowel movements. Nursing Notes were reviewed. REVIEW OF SYSTEMS    (2-9 systems for level 4, 10 or more for level 5)     Review of Systems   Constitutional: Negative for activity change, appetite change, chills, diaphoresis, fatigue and fever. HENT: Negative for congestion, ear pain, postnasal drip, rhinorrhea, sinus pressure, sinus pain, sore throat and trouble swallowing. Eyes: Negative for visual disturbance.    Respiratory: Negative for cough, chest tightness, shortness of breath and wheezing. Cardiovascular: Negative for chest pain and palpitations. Gastrointestinal: Positive for abdominal pain and nausea. Negative for abdominal distention, constipation, diarrhea and vomiting. Genitourinary: Negative for decreased urine volume, difficulty urinating, dysuria, flank pain, frequency, menstrual problem, pelvic pain, urgency, vaginal bleeding, vaginal discharge and vaginal pain. Musculoskeletal: Negative for arthralgias, back pain, myalgias, neck pain and neck stiffness. Skin: Negative for color change and rash. Neurological: Negative for dizziness, tremors, seizures, syncope, speech difficulty, weakness, light-headedness, numbness and headaches. Except as noted above the remainder of the review of systems was reviewed and negative.        PAST MEDICAL HISTORY     Past Medical History:   Diagnosis Date    Anxiety     Depression     Headache     Pneumonia      Past Surgical History:   Procedure Laterality Date    TYMPANOSTOMY TUBE PLACEMENT       Social History     Socioeconomic History    Marital status: Single     Spouse name: None    Number of children: None    Years of education: None    Highest education level: None   Occupational History    Occupation: employed     Comment: full time   Social Needs    Financial resource strain: None    Food insecurity     Worry: None     Inability: None    Transportation needs     Medical: None     Non-medical: None   Tobacco Use    Smoking status: Current Every Day Smoker     Packs/day: 1.00     Years: 10.00     Pack years: 10.00     Types: Cigarettes    Smokeless tobacco: Never Used   Substance and Sexual Activity    Alcohol use: Yes     Comment: occasional    Drug use: Yes     Frequency: 7.0 times per week     Types: Marijuana     Comment: daily    Sexual activity: Never   Lifestyle    Physical activity     Days per week: None     Minutes per session: None    Stress: None Skin:     General: Skin is warm and dry. Capillary Refill: Capillary refill takes less than 2 seconds. Neurological:      General: No focal deficit present. Mental Status: She is alert and oriented to person, place, and time. Mental status is at baseline. Cranial Nerves: No cranial nerve deficit. Sensory: No sensory deficit. Motor: No weakness. Coordination: Coordination normal.         RESULTS     EKG: All EKG's are interpreted by the Emergency Department Physician who either signs or Co-signsthis chart in the absence of a cardiologist.        RADIOLOGY:   Molly Alvarado such as CT, Ultrasound and MRI are read by the radiologist. Plain radiographic images are visualized and preliminarily interpreted by the emergency physician with the below findings:        Interpretation per the Radiologist below, if available at the time ofthis note:    CT ABDOMEN PELVIS W IV CONTRAST Additional Contrast? None    (Results Pending)         ED BEDSIDE ULTRASOUND:   Performed by ED Physician - none    LABS:  Labs Reviewed   CBC WITH AUTO DIFFERENTIAL - Abnormal; Notable for the following components:       Result Value    MCV 72.8 (*)     MCH 23.9 (*)     MCHC 32.9 (*)     RDW 16.1 (*)     All other components within normal limits   COMPREHENSIVE METABOLIC PANEL - Abnormal; Notable for the following components:    CREATININE 1.04 (*)     All other components within normal limits   URINE RT REFLEX TO CULTURE - Abnormal; Notable for the following components:    Leukocyte Esterase, Urine SMALL (*)     All other components within normal limits   MICROSCOPIC URINALYSIS - Abnormal; Notable for the following components:    WBC, UA 20-50 (*)     All other components within normal limits   CULTURE, URINE   LIPASE   POC PREGNANCY UR-QUAL       All other labs were within normal range or not returned as of this dictation.     EMERGENCY DEPARTMENT COURSE and DIFFERENTIAL DIAGNOSIS/MDM:   Vitals:    Vitals: the dictations but occasionally words are mis-transcribed.)    Elvis Bhatia, ASHLEY - CNP (electronically signed)  Attending Emergency Physician         [facility], ASHLEY - CNP  01/12/21 9022

## 2021-01-12 NOTE — ED NOTES
Bed: 08  Expected date: 1/12/21  Expected time:   Means of arrival:   Comments:  30 F  mid-abdominal pain  110/76, 88, 100% on room air     Fernanda Lux RN  01/12/21 0420

## 2021-01-13 LAB — URINE CULTURE, ROUTINE: NORMAL

## 2021-07-19 ENCOUNTER — APPOINTMENT (OUTPATIENT)
Dept: GENERAL RADIOLOGY | Age: 31
End: 2021-07-19
Payer: MEDICARE

## 2021-07-19 ENCOUNTER — HOSPITAL ENCOUNTER (EMERGENCY)
Age: 31
Discharge: HOME | End: 2021-07-19
Payer: MEDICARE

## 2021-07-19 VITALS
TEMPERATURE: 98.5 F | DIASTOLIC BLOOD PRESSURE: 76 MMHG | HEART RATE: 92 BPM | WEIGHT: 145 LBS | SYSTOLIC BLOOD PRESSURE: 110 MMHG | BODY MASS INDEX: 26.68 KG/M2 | HEIGHT: 62 IN | OXYGEN SATURATION: 100 % | RESPIRATION RATE: 16 BRPM

## 2021-07-19 DIAGNOSIS — M25.562 CHRONIC PAIN OF LEFT KNEE: Primary | ICD-10-CM

## 2021-07-19 DIAGNOSIS — G89.29 CHRONIC PAIN OF LEFT KNEE: Primary | ICD-10-CM

## 2021-07-19 DIAGNOSIS — F41.1 ANXIETY STATE: ICD-10-CM

## 2021-07-19 DIAGNOSIS — N30.00 ACUTE CYSTITIS WITHOUT HEMATURIA: ICD-10-CM

## 2021-07-19 LAB
BACTERIA: NEGATIVE /HPF
BILIRUBIN URINE: NEGATIVE
BLOOD, URINE: NEGATIVE
CLARITY: ABNORMAL
COLOR: ABNORMAL
EPITHELIAL CELLS, UA: ABNORMAL /HPF (ref 0–5)
GLUCOSE URINE: NEGATIVE MG/DL
HCG, URINE, POC: NEGATIVE
HYALINE CASTS: ABNORMAL /LPF (ref 0–5)
KETONES, URINE: 15 MG/DL
LEUKOCYTE ESTERASE, URINE: ABNORMAL
Lab: NORMAL
NEGATIVE QC PASS/FAIL: NORMAL
NITRITE, URINE: NEGATIVE
PH UA: 5.5 (ref 5–9)
POSITIVE QC PASS/FAIL: NORMAL
PROTEIN UA: ABNORMAL MG/DL
RBC UA: ABNORMAL /HPF (ref 0–5)
SPECIFIC GRAVITY UA: 1.03 (ref 1–1.03)
URINE REFLEX TO CULTURE: YES
UROBILINOGEN, URINE: 1 E.U./DL
WBC UA: >100 /HPF (ref 0–5)

## 2021-07-19 PROCEDURE — 81003 URINALYSIS AUTO W/O SCOPE: CPT

## 2021-07-19 PROCEDURE — 87086 URINE CULTURE/COLONY COUNT: CPT

## 2021-07-19 PROCEDURE — 73564 X-RAY EXAM KNEE 4 OR MORE: CPT

## 2021-07-19 PROCEDURE — 99283 EMERGENCY DEPT VISIT LOW MDM: CPT

## 2021-07-19 RX ORDER — NAPROXEN 500 MG/1
500 TABLET ORAL 2 TIMES DAILY
Qty: 20 TABLET | Refills: 0 | Status: SHIPPED | OUTPATIENT
Start: 2021-07-19 | End: 2021-07-29

## 2021-07-19 RX ORDER — SULFAMETHOXAZOLE AND TRIMETHOPRIM 800; 160 MG/1; MG/1
1 TABLET ORAL 2 TIMES DAILY
Qty: 14 TABLET | Refills: 0 | Status: SHIPPED | OUTPATIENT
Start: 2021-07-19 | End: 2021-07-26

## 2021-07-19 ASSESSMENT — PAIN DESCRIPTION - ORIENTATION: ORIENTATION: LEFT

## 2021-07-19 ASSESSMENT — PAIN DESCRIPTION - DESCRIPTORS: DESCRIPTORS: THROBBING;SHARP

## 2021-07-19 ASSESSMENT — ENCOUNTER SYMPTOMS
VOMITING: 0
SHORTNESS OF BREATH: 0
SORE THROAT: 0
COUGH: 0
EYE REDNESS: 0
ABDOMINAL PAIN: 0
BLOOD IN STOOL: 0
WHEEZING: 0
COLOR CHANGE: 0
DIARRHEA: 0
EYE DISCHARGE: 0
EYE PAIN: 0
NAUSEA: 0
BACK PAIN: 0
CONSTIPATION: 0
RHINORRHEA: 0
TROUBLE SWALLOWING: 0

## 2021-07-19 ASSESSMENT — PAIN DESCRIPTION - LOCATION: LOCATION: KNEE

## 2021-07-19 ASSESSMENT — PAIN DESCRIPTION - FREQUENCY: FREQUENCY: CONTINUOUS

## 2021-07-19 ASSESSMENT — PAIN SCALES - GENERAL: PAINLEVEL_OUTOF10: 10

## 2021-07-19 ASSESSMENT — PAIN DESCRIPTION - PAIN TYPE: TYPE: CHRONIC PAIN

## 2021-07-19 NOTE — ED PROVIDER NOTES
[address] [facility] ED  EMERGENCY DEPARTMENT ENCOUNTER      Pt Name: Javier Leon  MRN: [id number]  [place] 1990  Date of evaluation: 7/19/2021  Provider: ASHLEY De La Paz CNP    CHIEF COMPLAINT       Chief Complaint   Patient presents with    Other     pt c/o a loss of appetite for the past week, and chronic left knee pain         HISTORY OF PRESENT ILLNESS   (Location/Symptom, Timing/Onset,Context/Setting, Quality, Duration, Modifying Factors, Severity)  Note limiting factors. Javier Leon is a 36 y.o. female who presents to the emergency department with a chart reviewed past medical history of bipolar depression and generalized anxiety disorder for a chief complaint of acute exacerbation of 7 out of 10 chronic left knee pain described as achy ongoing for the past couple of days. She gets this exacerbation every once in a while. She states sometimes the knee pops out of place. In addition she reports generalized fatigue and loss of appetite for the past week but she denies experiencing any vomiting and she was seen and had an evaluation done. She thinks that this loss of appetite is due to anxiety but she does not necessarily have any other symptoms. She denies fevers, chills, headaches, alleviating or modifying factors. Nursing Notes were reviewed. REVIEW OF SYSTEMS    (2-9 systems for level 4, 10 or more for level 5)     Review of Systems   Constitutional: Positive for appetite change. Negative for activity change, fatigue and fever. HENT: Negative for congestion, ear pain, rhinorrhea, sore throat and trouble swallowing. Eyes: Negative for pain, discharge and redness. Respiratory: Negative for cough, shortness of breath and wheezing. Cardiovascular: Negative for chest pain and palpitations. Gastrointestinal: Negative for abdominal pain, blood in stool, constipation, diarrhea, nausea and vomiting. Endocrine: Negative for polydipsia and polyuria. Genitourinary: Negative for decreased urine volume, dysuria, flank pain and hematuria. Musculoskeletal: Positive for myalgias. Negative for arthralgias and back pain. Skin: Negative for color change, rash and wound. Neurological: Negative for dizziness, syncope, weakness, light-headedness and headaches. Psychiatric/Behavioral: Negative for behavioral problems. All other systems reviewed and are negative. Except as noted above the remainder of the review of systems was reviewed and negative. PAST MEDICAL HISTORY     Past Medical History:   Diagnosis Date    Anxiety     Depression     Headache     Pneumonia      Past Surgical History:   Procedure Laterality Date    TYMPANOSTOMY TUBE PLACEMENT       Social History     Socioeconomic History    Marital status: Single     Spouse name: None    Number of children: None    Years of education: None    Highest education level: None   Occupational History    Occupation: employed     Comment: full time   Tobacco Use    Smoking status: Current Every Day Smoker     Packs/day: 1.00     Years: 10.00     Pack years: 10.00     Types: Cigarettes    Smokeless tobacco: Never Used   Vaping Use    Vaping Use: Never used   Substance and Sexual Activity    Alcohol use: Yes     Comment: occasional    Drug use: Yes     Frequency: 7.0 times per week     Types: Marijuana     Comment: daily    Sexual activity: Never   Other Topics Concern    None   Social History Narrative    None     Social Determinants of Health     Financial Resource Strain:     Difficulty of Paying Living Expenses:    Food Insecurity:     Worried About Running Out of Food in the Last Year:     Ran Out of Food in the Last Year:    Transportation Needs:     Lack of Transportation (Medical):      Lack of Transportation (Non-Medical):    Physical Activity:     Days of Exercise per Week:     Minutes of Exercise per Session:    Stress:     Feeling of Stress :    Social Connections:  Frequency of Communication with Friends and Family:     Frequency of Social Gatherings with Friends and Family:     Attends Yarsanism Services:     Active Member of Clubs or Organizations:     Attends Club or Organization Meetings:     Marital Status:    Intimate Partner Violence:     Fear of Current or Ex-Partner:     Emotionally Abused:     Physically Abused:     Sexually Abused:        SCREENINGS             PHYSICAL EXAM    (up to 7 for level 4, 8 or more for level 5)     ED Triage Vitals [07/19/21 1734]   BP Temp Temp Source Pulse Resp SpO2 Height Weight   110/76 98.5 °F (36.9 °C) Oral 92 16 100 % 5' 2\" (1.575 m) 145 lb (65.8 kg)       Physical Exam  Vitals and nursing note reviewed. Constitutional:       General: She is not in acute distress. Appearance: She is well-developed. She is not diaphoretic. HENT:      Head: Normocephalic and atraumatic. Nose: Nose normal.   Eyes:      Conjunctiva/sclera: Conjunctivae normal.      Pupils: Pupils are equal, round, and reactive to light. Cardiovascular:      Rate and Rhythm: Normal rate and regular rhythm. Heart sounds: Normal heart sounds. Pulmonary:      Effort: Pulmonary effort is normal. No respiratory distress. Breath sounds: Normal breath sounds. Abdominal:      General: Bowel sounds are normal.      Palpations: Abdomen is soft. Tenderness: There is no abdominal tenderness. Musculoskeletal:      Cervical back: Normal range of motion and neck supple. Left knee: Tenderness present. Skin:     General: Skin is warm and dry. Capillary Refill: Capillary refill takes less than 2 seconds. Findings: No rash. Neurological:      Mental Status: She is alert and oriented to person, place, and time. Cranial Nerves: No cranial nerve deficit.    Psychiatric:         Behavior: Behavior normal.         RESULTS     EKG: All EKG's are interpreted by the Emergency Department Physician who either signs or Co-signsthis chart in the absence of a cardiologist.        RADIOLOGY:   Joylene Hallmark such as CT, Ultrasound and MRI are read by the radiologist. Plain radiographic images are visualized and preliminarily interpreted by the emergency physician with the below findings:    NAD    Interpretation per the Radiologist below, if available at the time ofthis note:    XR KNEE LEFT (MIN 4 VIEWS)    (Results Pending)         ED BEDSIDE ULTRASOUND:   Performed by ED Physician - none    LABS:  Labs Reviewed   URINE RT REFLEX TO CULTURE - Abnormal; Notable for the following components:       Result Value    Color, UA DARK YELLOW (*)     Clarity, UA CLOUDY (*)     Ketones, Urine 15 (*)     Protein, UA TRACE (*)     Leukocyte Esterase, Urine MODERATE (*)     All other components within normal limits   MICROSCOPIC URINALYSIS - Abnormal; Notable for the following components:    WBC, UA >100 (*)     RBC, UA 3-5 (*)     All other components within normal limits   CULTURE, URINE   POC PREGNANCY UR-QUAL       All other labs were within normal range or not returned as of this dictation. EMERGENCY DEPARTMENT COURSE and DIFFERENTIAL DIAGNOSIS/MDM:   Vitals:    Vitals:    07/19/21 1734   BP: 110/76   Pulse: 92   Resp: 16   Temp: 98.5 °F (36.9 °C)   TempSrc: Oral   SpO2: 100%   Weight: 145 lb (65.8 kg)   Height: 5' 2\" (1.575 m)            MDM   Patient is a 57-year-old female presenting the ER with a chief complaint left knee pain. She also reports some loss of appetite. She is hemodynamically stable nontoxic-appearing. UA showing a UTI which could be related to patient's symptoms. X-ray negative for abnormalities. Patient sent home with a prescription for Naprosyn and Bactrim. Instructed to follow-up primary care. Struck to come back to the ER if worse in any way. Discharged in stable condition stable vitals.     CRITICAL CARE TIME       CONSULTS:  None    PROCEDURES:  Unless otherwise noted below, none Procedures    FINAL IMPRESSION      1. Chronic pain of left knee    2. Acute cystitis without hematuria    3.  Anxiety state          DISPOSITION/PLAN   DISPOSITION Decision To Discharge 07/19/2021 07:18:58 PM      PATIENT REFERRED TO:  Suly Rahman MD  [address] [place] [address]  [phone]    Schedule an appointment as soon as possible for a visit in 1 day        DISCHARGE MEDICATIONS:  New Prescriptions    NAPROXEN (NAPROSYN) 500 MG TABLET    Take 1 tablet by mouth 2 times daily for 20 doses    SULFAMETHOXAZOLE-TRIMETHOPRIM (BACTRIM DS;SEPTRA DS) 800-160 MG PER TABLET    Take 1 tablet by mouth 2 times daily for 7 days          (Please notethat portions of this note were completed with a voice recognition program.  Efforts were made to edit the dictations but occasionally words are mis-transcribed.)    ASHLEY Valdez CNP (electronically signed)  Attending Emergency Physician          ASHLEY Yepez CNP  07/19/21 1921

## 2021-07-19 NOTE — ED TRIAGE NOTES
Pt c/o a loss of appetite and chronic left knee pain, Pt seen at [facility] ER for the loss of appetite last week with no improvement denies vomiting and diarrhea, Pt is A&OX3, calm, ambulatory with discomfort, afebrile, breathes are equal and unlabored,

## 2021-07-21 LAB — URINE CULTURE, ROUTINE: NORMAL

## 2023-06-26 ENCOUNTER — APPOINTMENT (OUTPATIENT)
Dept: ULTRASOUND IMAGING | Age: 33
End: 2023-06-26
Payer: MEDICAID

## 2023-06-26 ENCOUNTER — HOSPITAL ENCOUNTER (EMERGENCY)
Age: 33
Discharge: HOME | End: 2023-06-26
Payer: MEDICAID

## 2023-06-26 VITALS
RESPIRATION RATE: 16 BRPM | TEMPERATURE: 97.6 F | HEIGHT: 64 IN | BODY MASS INDEX: 34.15 KG/M2 | WEIGHT: 200 LBS | HEART RATE: 82 BPM | OXYGEN SATURATION: 98 % | DIASTOLIC BLOOD PRESSURE: 79 MMHG | SYSTOLIC BLOOD PRESSURE: 117 MMHG

## 2023-06-26 DIAGNOSIS — N83.201 BILATERAL OVARIAN CYSTS: Primary | ICD-10-CM

## 2023-06-26 DIAGNOSIS — N83.202 BILATERAL OVARIAN CYSTS: Primary | ICD-10-CM

## 2023-06-26 DIAGNOSIS — N80.129 ENDOMETRIOMA: ICD-10-CM

## 2023-06-26 LAB
ALBUMIN SERPL-MCNC: 4 G/DL (ref 3.5–4.6)
ALP SERPL-CCNC: 74 U/L (ref 40–130)
ALT SERPL-CCNC: 7 U/L (ref 0–33)
ANION GAP SERPL CALCULATED.3IONS-SCNC: 10 MEQ/L (ref 9–15)
ANISOCYTOSIS BLD QL SMEAR: ABNORMAL
AST SERPL-CCNC: 13 U/L (ref 0–35)
BACTERIA URNS QL MICRO: ABNORMAL /HPF
BASOPHILS # BLD: 0 K/UL (ref 0–0.2)
BASOPHILS NFR BLD: 0.6 %
BILIRUB SERPL-MCNC: <0.2 MG/DL (ref 0.2–0.7)
BILIRUB UR QL STRIP: NEGATIVE
BUN SERPL-MCNC: 17 MG/DL (ref 6–20)
CALCIUM SERPL-MCNC: 9 MG/DL (ref 8.5–9.9)
CHLORIDE SERPL-SCNC: 102 MEQ/L (ref 95–107)
CLARITY UR: ABNORMAL
CO2 SERPL-SCNC: 25 MEQ/L (ref 20–31)
COLOR UR: YELLOW
CREAT SERPL-MCNC: 1.18 MG/DL (ref 0.5–0.9)
EOSINOPHIL # BLD: 0.2 K/UL (ref 0–0.7)
EOSINOPHIL NFR BLD: 1.9 %
EPI CELLS #/AREA URNS AUTO: ABNORMAL /HPF (ref 0–5)
ERYTHROCYTE [DISTWIDTH] IN BLOOD BY AUTOMATED COUNT: 16.3 % (ref 11.5–14.5)
GLOBULIN SER CALC-MCNC: 2.6 G/DL (ref 2.3–3.5)
GLUCOSE SERPL-MCNC: 88 MG/DL (ref 70–99)
GLUCOSE UR STRIP-MCNC: NEGATIVE MG/DL
HCG, URINE, POC: NEGATIVE
HCT VFR BLD AUTO: 37.8 % (ref 37–47)
HGB BLD-MCNC: 12.1 G/DL (ref 12–16)
HGB UR QL STRIP: NEGATIVE
HYALINE CASTS #/AREA URNS AUTO: ABNORMAL /HPF (ref 0–5)
KETONES UR STRIP-MCNC: NEGATIVE MG/DL
LEUKOCYTE ESTERASE UR QL STRIP: ABNORMAL
LYMPHOCYTES # BLD: 3.2 K/UL (ref 1–4.8)
LYMPHOCYTES NFR BLD: 40.5 %
Lab: NORMAL
MCH RBC QN AUTO: 23.5 PG (ref 27–31.3)
MCHC RBC AUTO-ENTMCNC: 32.1 % (ref 33–37)
MCV RBC AUTO: 73.1 FL (ref 79.4–94.8)
MICROCYTES BLD QL SMEAR: ABNORMAL
MONOCYTES # BLD: 0.5 K/UL (ref 0.2–0.8)
MONOCYTES NFR BLD: 6.9 %
NEGATIVE QC PASS/FAIL: NORMAL
NEUTROPHILS # BLD: 4 K/UL (ref 1.4–6.5)
NEUTS SEG NFR BLD: 50.1 %
NITRITE UR QL STRIP: NEGATIVE
OVALOCYTES BLD QL SMEAR: ABNORMAL
PH UR STRIP: 7 [PH] (ref 5–9)
PLATELET # BLD AUTO: 296 K/UL (ref 130–400)
POLYCHROMASIA BLD QL SMEAR: ABNORMAL
POSITIVE QC PASS/FAIL: NORMAL
POTASSIUM SERPL-SCNC: 4.1 MEQ/L (ref 3.4–4.9)
PROT SERPL-MCNC: 6.6 G/DL (ref 6.3–8)
PROT UR STRIP-MCNC: ABNORMAL MG/DL
RBC # BLD AUTO: 5.17 M/UL (ref 4.2–5.4)
RBC #/AREA URNS AUTO: ABNORMAL /HPF (ref 0–5)
SODIUM SERPL-SCNC: 137 MEQ/L (ref 135–144)
SP GR UR STRIP: 1.02 (ref 1–1.03)
TARGETS BLD QL SMEAR: ABNORMAL
URINE REFLEX TO CULTURE: YES
UROBILINOGEN UR STRIP-ACNC: 1 E.U./DL
WBC # BLD AUTO: 7.9 K/UL (ref 4.8–10.8)
WBC #/AREA URNS AUTO: ABNORMAL /HPF (ref 0–5)

## 2023-06-26 PROCEDURE — 76856 US EXAM PELVIC COMPLETE: CPT

## 2023-06-26 PROCEDURE — 2580000003 HC RX 258

## 2023-06-26 PROCEDURE — 96372 THER/PROPH/DIAG INJ SC/IM: CPT

## 2023-06-26 PROCEDURE — 81001 URINALYSIS AUTO W/SCOPE: CPT

## 2023-06-26 PROCEDURE — 85025 COMPLETE CBC W/AUTO DIFF WBC: CPT

## 2023-06-26 PROCEDURE — 87086 URINE CULTURE/COLONY COUNT: CPT

## 2023-06-26 PROCEDURE — 36415 COLL VENOUS BLD VENIPUNCTURE: CPT

## 2023-06-26 PROCEDURE — 96374 THER/PROPH/DIAG INJ IV PUSH: CPT

## 2023-06-26 PROCEDURE — 93975 VASCULAR STUDY: CPT

## 2023-06-26 PROCEDURE — 99284 EMERGENCY DEPT VISIT MOD MDM: CPT

## 2023-06-26 PROCEDURE — 6360000002 HC RX W HCPCS

## 2023-06-26 PROCEDURE — 80053 COMPREHEN METABOLIC PANEL: CPT

## 2023-06-26 PROCEDURE — 76830 TRANSVAGINAL US NON-OB: CPT

## 2023-06-26 RX ORDER — KETOROLAC TROMETHAMINE 30 MG/ML
15 INJECTION, SOLUTION INTRAMUSCULAR; INTRAVENOUS ONCE
Status: COMPLETED | OUTPATIENT
Start: 2023-06-26 | End: 2023-06-26

## 2023-06-26 RX ORDER — KETOROLAC TROMETHAMINE 10 MG/1
10 TABLET, FILM COATED ORAL EVERY 6 HOURS PRN
Qty: 20 TABLET | Refills: 0 | Status: SHIPPED | OUTPATIENT
Start: 2023-06-26 | End: 2023-07-26

## 2023-06-26 RX ORDER — KETOROLAC TROMETHAMINE 30 MG/ML
15 INJECTION, SOLUTION INTRAMUSCULAR; INTRAVENOUS ONCE
Status: DISCONTINUED | OUTPATIENT
Start: 2023-06-26 | End: 2023-06-26

## 2023-06-26 RX ORDER — 0.9 % SODIUM CHLORIDE 0.9 %
500 INTRAVENOUS SOLUTION INTRAVENOUS ONCE
Status: COMPLETED | OUTPATIENT
Start: 2023-06-26 | End: 2023-06-26

## 2023-06-26 RX ADMIN — KETOROLAC TROMETHAMINE 15 MG: 30 INJECTION, SOLUTION INTRAMUSCULAR; INTRAVENOUS at 20:54

## 2023-06-26 RX ADMIN — KETOROLAC TROMETHAMINE 15 MG: 30 INJECTION, SOLUTION INTRAMUSCULAR; INTRAVENOUS at 19:16

## 2023-06-26 RX ADMIN — SODIUM CHLORIDE 500 ML: 9 INJECTION, SOLUTION INTRAVENOUS at 19:15

## 2023-06-26 ASSESSMENT — PAIN DESCRIPTION - DESCRIPTORS
DESCRIPTORS: ACHING
DESCRIPTORS: SHARP
DESCRIPTORS: ACHING

## 2023-06-26 ASSESSMENT — PAIN DESCRIPTION - ONSET: ONSET: ON-GOING

## 2023-06-26 ASSESSMENT — LIFESTYLE VARIABLES
HOW MANY STANDARD DRINKS CONTAINING ALCOHOL DO YOU HAVE ON A TYPICAL DAY: PATIENT DOES NOT DRINK
HOW OFTEN DO YOU HAVE A DRINK CONTAINING ALCOHOL: NEVER

## 2023-06-26 ASSESSMENT — PAIN DESCRIPTION - LOCATION
LOCATION: ABDOMEN;VAGINA;BACK
LOCATION: PELVIS
LOCATION: PELVIS

## 2023-06-26 ASSESSMENT — ENCOUNTER SYMPTOMS
COUGH: 0
ABDOMINAL PAIN: 0
PHOTOPHOBIA: 0
BACK PAIN: 1
VOMITING: 0
SHORTNESS OF BREATH: 0
DIARRHEA: 0
NAUSEA: 1

## 2023-06-26 ASSESSMENT — PAIN DESCRIPTION - ORIENTATION: ORIENTATION: LOWER

## 2023-06-26 ASSESSMENT — PAIN DESCRIPTION - FREQUENCY: FREQUENCY: CONTINUOUS

## 2023-06-26 ASSESSMENT — PAIN DESCRIPTION - PAIN TYPE: TYPE: ACUTE PAIN

## 2023-06-26 ASSESSMENT — PAIN SCALES - GENERAL
PAINLEVEL_OUTOF10: 10

## 2023-06-26 ASSESSMENT — PAIN - FUNCTIONAL ASSESSMENT: PAIN_FUNCTIONAL_ASSESSMENT: 0-10

## 2023-06-28 LAB — BACTERIA UR CULT: NORMAL

## 2023-07-10 ENCOUNTER — OFFICE VISIT (OUTPATIENT)
Dept: OBGYN CLINIC | Age: 33
End: 2023-07-10
Payer: MEDICAID

## 2023-07-10 VITALS
WEIGHT: 186 LBS | SYSTOLIC BLOOD PRESSURE: 102 MMHG | DIASTOLIC BLOOD PRESSURE: 72 MMHG | HEIGHT: 63 IN | BODY MASS INDEX: 32.96 KG/M2

## 2023-07-10 DIAGNOSIS — Z11.51 SCREENING FOR HUMAN PAPILLOMAVIRUS: ICD-10-CM

## 2023-07-10 DIAGNOSIS — N97.9 INFERTILITY, FEMALE, PRIMARY: ICD-10-CM

## 2023-07-10 DIAGNOSIS — R10.9 ABDOMINAL PAIN, UNSPECIFIED ABDOMINAL LOCATION: ICD-10-CM

## 2023-07-10 DIAGNOSIS — Z12.4 CERVICAL CANCER SCREENING: ICD-10-CM

## 2023-07-10 DIAGNOSIS — N91.5 OLIGOMENORRHEA, UNSPECIFIED TYPE: ICD-10-CM

## 2023-07-10 DIAGNOSIS — N83.202 CYSTS OF BOTH OVARIES: Primary | ICD-10-CM

## 2023-07-10 DIAGNOSIS — N83.201 CYSTS OF BOTH OVARIES: Primary | ICD-10-CM

## 2023-07-10 LAB
ALBUMIN SERPL-MCNC: 4.1 G/DL (ref 3.5–4.6)
ALP SERPL-CCNC: 62 U/L (ref 40–130)
ALT SERPL-CCNC: 11 U/L (ref 0–33)
ANION GAP SERPL CALCULATED.3IONS-SCNC: 10 MEQ/L (ref 9–15)
AST SERPL-CCNC: 16 U/L (ref 0–35)
BASOPHILS # BLD: 0.1 K/UL (ref 0–0.2)
BASOPHILS NFR BLD: 0.7 %
BILIRUB SERPL-MCNC: <0.2 MG/DL (ref 0.2–0.7)
BUN SERPL-MCNC: 18 MG/DL (ref 6–20)
CALCIUM SERPL-MCNC: 8.9 MG/DL (ref 8.5–9.9)
CHLORIDE SERPL-SCNC: 103 MEQ/L (ref 95–107)
CO2 SERPL-SCNC: 22 MEQ/L (ref 20–31)
CREAT SERPL-MCNC: 1.11 MG/DL (ref 0.5–0.9)
EOSINOPHIL # BLD: 0.2 K/UL (ref 0–0.7)
EOSINOPHIL NFR BLD: 2.2 %
ERYTHROCYTE [DISTWIDTH] IN BLOOD BY AUTOMATED COUNT: 16.3 % (ref 11.5–14.5)
GLOBULIN SER CALC-MCNC: 2.8 G/DL (ref 2.3–3.5)
GLUCOSE SERPL-MCNC: 97 MG/DL (ref 70–99)
GONADOTROPIN, CHORIONIC (HCG) QUANT: <0.1 MIU/ML
HBA1C MFR BLD: 5.9 % (ref 4.8–5.9)
HCT VFR BLD AUTO: 38.7 % (ref 37–47)
HGB BLD-MCNC: 12.4 G/DL (ref 12–16)
LYMPHOCYTES # BLD: 2.6 K/UL (ref 1–4.8)
LYMPHOCYTES NFR BLD: 32.8 %
MCH RBC QN AUTO: 23.3 PG (ref 27–31.3)
MCHC RBC AUTO-ENTMCNC: 32.1 % (ref 33–37)
MCV RBC AUTO: 72.6 FL (ref 79.4–94.8)
MONOCYTES # BLD: 0.4 K/UL (ref 0.2–0.8)
MONOCYTES NFR BLD: 5.6 %
NEUTROPHILS # BLD: 4.6 K/UL (ref 1.4–6.5)
NEUTS SEG NFR BLD: 58.7 %
PLATELET # BLD AUTO: 283 K/UL (ref 130–400)
POTASSIUM SERPL-SCNC: 4.2 MEQ/L (ref 3.4–4.9)
PROLACTIN SERPL-MCNC: 11.8 NG/ML
PROT SERPL-MCNC: 6.9 G/DL (ref 6.3–8)
RBC # BLD AUTO: 5.33 M/UL (ref 4.2–5.4)
SODIUM SERPL-SCNC: 135 MEQ/L (ref 135–144)
TSH REFLEX: 1.4 UIU/ML (ref 0.44–3.86)
WBC # BLD AUTO: 7.9 K/UL (ref 4.8–10.8)

## 2023-07-10 PROCEDURE — 99214 OFFICE O/P EST MOD 30 MIN: CPT | Performed by: OBSTETRICS & GYNECOLOGY

## 2023-07-10 RX ORDER — IBUPROFEN 800 MG/1
800 TABLET ORAL EVERY 8 HOURS PRN
Qty: 120 TABLET | Refills: 3 | Status: SHIPPED | OUTPATIENT
Start: 2023-07-10

## 2023-07-10 SDOH — ECONOMIC STABILITY: INCOME INSECURITY: HOW HARD IS IT FOR YOU TO PAY FOR THE VERY BASICS LIKE FOOD, HOUSING, MEDICAL CARE, AND HEATING?: NOT HARD AT ALL

## 2023-07-10 SDOH — ECONOMIC STABILITY: HOUSING INSECURITY
IN THE LAST 12 MONTHS, WAS THERE A TIME WHEN YOU DID NOT HAVE A STEADY PLACE TO SLEEP OR SLEPT IN A SHELTER (INCLUDING NOW)?: NO

## 2023-07-10 SDOH — ECONOMIC STABILITY: FOOD INSECURITY: WITHIN THE PAST 12 MONTHS, THE FOOD YOU BOUGHT JUST DIDN'T LAST AND YOU DIDN'T HAVE MONEY TO GET MORE.: NEVER TRUE

## 2023-07-10 SDOH — ECONOMIC STABILITY: FOOD INSECURITY: WITHIN THE PAST 12 MONTHS, YOU WORRIED THAT YOUR FOOD WOULD RUN OUT BEFORE YOU GOT MONEY TO BUY MORE.: NEVER TRUE

## 2023-07-10 ASSESSMENT — PATIENT HEALTH QUESTIONNAIRE - PHQ9
2. FEELING DOWN, DEPRESSED OR HOPELESS: 0
SUM OF ALL RESPONSES TO PHQ9 QUESTIONS 1 & 2: 0
4. FEELING TIRED OR HAVING LITTLE ENERGY: 0
3. TROUBLE FALLING OR STAYING ASLEEP: 0
6. FEELING BAD ABOUT YOURSELF - OR THAT YOU ARE A FAILURE OR HAVE LET YOURSELF OR YOUR FAMILY DOWN: 0
SUM OF ALL RESPONSES TO PHQ QUESTIONS 1-9: 0
10. IF YOU CHECKED OFF ANY PROBLEMS, HOW DIFFICULT HAVE THESE PROBLEMS MADE IT FOR YOU TO DO YOUR WORK, TAKE CARE OF THINGS AT HOME, OR GET ALONG WITH OTHER PEOPLE: 0
5. POOR APPETITE OR OVEREATING: 0
7. TROUBLE CONCENTRATING ON THINGS, SUCH AS READING THE NEWSPAPER OR WATCHING TELEVISION: 0
1. LITTLE INTEREST OR PLEASURE IN DOING THINGS: 0
9. THOUGHTS THAT YOU WOULD BE BETTER OFF DEAD, OR OF HURTING YOURSELF: 0
SUM OF ALL RESPONSES TO PHQ QUESTIONS 1-9: 0
8. MOVING OR SPEAKING SO SLOWLY THAT OTHER PEOPLE COULD HAVE NOTICED. OR THE OPPOSITE, BEING SO FIGETY OR RESTLESS THAT YOU HAVE BEEN MOVING AROUND A LOT MORE THAN USUAL: 0

## 2023-07-10 ASSESSMENT — ENCOUNTER SYMPTOMS
NAUSEA: 0
RESPIRATORY NEGATIVE: 1
ABDOMINAL PAIN: 0
BLOOD IN STOOL: 0
EYES NEGATIVE: 1
ABDOMINAL DISTENTION: 0
VOMITING: 0
CONSTIPATION: 0
DIARRHEA: 0
RECTAL PAIN: 0
ANAL BLEEDING: 0
ALLERGIC/IMMUNOLOGIC NEGATIVE: 1

## 2023-07-10 NOTE — PROGRESS NOTES
The patient was asked if she would like a chaperone present for her intimate exam. She  Declined the chaperone.  Tracy Nolan CMA ([facility])
7/10/2024    HCG, Quantitative, Pregnancy     Standing Status:   Future     Number of Occurrences:   1     Standing Expiration Date:   7/10/2024    Hemoglobin A1C     Standing Status:   Future     Number of Occurrences:   1     Standing Expiration Date:   7/10/2024    Insulin Free & Total     Standing Status:   Future     Number of Occurrences:   1     Standing Expiration Date:   7/10/2024    Prolactin     Standing Status:   Future     Number of Occurrences:   1     Standing Expiration Date:   7/10/2024    Testosterone, free, total     Standing Status:   Future     Number of Occurrences:   1     Standing Expiration Date:   7/10/2024    TSH with Reflex     Standing Status:   Future     Number of Occurrences:   1     Standing Expiration Date:   7/10/2024    Vitamin B12     Standing Status:   Future     Number of Occurrences:   1     Standing Expiration Date:   7/10/2024    Vitamin D 25 Hydroxy     Standing Status:   Future     Number of Occurrences:   1     Standing Expiration Date:   7/10/2024    PAP SMEAR     Patient History:    Patient's last menstrual period was 07/09/2023. OBGYN Status: Having periods  Past Surgical History:  No date: TYMPANOSTOMY TUBE PLACEMENT      Social History    Tobacco Use      Smoking status: Every Day        Packs/day: 1.00        Years: 10.00        Pack years: 10        Types: Cigarettes      Smokeless tobacco: Never       Standing Status:   Future     Standing Expiration Date:   7/10/2024     Order Specific Question:   Collection Type     Answer: Thin Prep     Order Specific Question:   Prior Abnormal Pap Test     Answer:   No     Order Specific Question:   Screening or Diagnostic     Answer:   Screening     Order Specific Question:   HPV Requested? Answer:   Yes     Order Specific Question:   High Risk Patient     Answer:   N/A         Follow up:  No follow-ups on file.

## 2023-07-11 ENCOUNTER — TELEPHONE (OUTPATIENT)
Dept: OBGYN CLINIC | Age: 33
End: 2023-07-11

## 2023-07-11 LAB
CLUE CELLS VAG QL WET PREP: NORMAL
DHEA-S SERPL-MCNC: 173 UG/DL (ref 45–270)
FOLLICLE STIMULATING HORMONE: 4.4 MIU/ML (ref 1.7–21.5)
LH: 5.9 MIU/ML (ref 1–95.6)
SHBG SERPL-SCNC: 79 NMOL/L (ref 30–135)
T VAGINALIS VAG QL WET PREP: NORMAL
TESTOST FREE SERPL-MCNC: 4.6 PG/ML (ref 1.3–9.2)
TESTOST SERPL-MCNC: 47 NG/DL (ref 20–70)
TRICHOMONAS VAGINALIS SCREEN: POSITIVE
VITAMIN B-12: 553 PG/ML (ref 232–1245)
VITAMIN D 25-HYDROXY: 23.3 NG/ML
YEAST VAG QL WET PREP: NORMAL

## 2023-07-11 RX ORDER — METRONIDAZOLE 500 MG/1
500 TABLET ORAL 2 TIMES DAILY
Qty: 14 TABLET | Refills: 0 | Status: SHIPPED | OUTPATIENT
Start: 2023-07-11 | End: 2023-07-18

## 2023-07-11 RX ORDER — MULTIVIT-MIN/IRON/FOLIC ACID/K 18-600-40
1 CAPSULE ORAL DAILY
Qty: 30 CAPSULE | Refills: 6 | Status: SHIPPED | OUTPATIENT
Start: 2023-07-11

## 2023-07-11 NOTE — TELEPHONE ENCOUNTER
----- Message from Krishna Parikh MD sent at 7/11/2023 11:33 AM EDT -----  + Charissa Primus. Flagyl 500 mg po bid x 7 days. Partner should be treated. PAUL 3 weeks.

## 2023-07-13 LAB
17OHP SERPL-MCNC: 33.8 NG/DL
INSULIN FREE SERPL-ACNC: 6 UIU/ML (ref 3–25)
INSULIN SERPL-ACNC: 8 UIU/ML (ref 3–25)
MIS SERPL-MCNC: 13.44 NG/ML (ref 0.18–11.71)

## 2023-07-14 LAB
C TRACH DNA CVX QL NAA+PROBE: NEGATIVE
HPV HR 12 DNA SPEC QL NAA+PROBE: DETECTED
HPV16 DNA SPEC QL NAA+PROBE: NOT DETECTED
HPV16+18+H RISK 12 DNA SPEC-IMP: ABNORMAL
HPV18 DNA SPEC QL NAA+PROBE: NOT DETECTED
N GONORRHOEA DNA CERV MUCUS QL NAA+PROBE: NEGATIVE

## 2023-07-31 ENCOUNTER — TELEPHONE (OUTPATIENT)
Dept: OBGYN CLINIC | Age: 33
End: 2023-07-31

## 2023-07-31 NOTE — TELEPHONE ENCOUNTER
Patient made aware insurance no longer accepted,provided medicaid contact number for patient to call and enroll with another insurance. Patient stated she would like to know her lab results,if possible that she may receive a call.

## 2023-08-01 NOTE — TELEPHONE ENCOUNTER
Tried calling pt back to make aware AMH mildly elevated c/w PCOS   Creatinine mildly elevated:  F/U with PCP   HbA1C 5.9% ( pre-diabetic range ):  F/U with PCP   Low Vitamin D:  Start 2000 IU Vitamin D3 per day, voicemail not set up and could not leave message

## 2023-08-09 NOTE — TELEPHONE ENCOUNTER
Tried calling pt back to make aware AMH mildly elevated c/w PCOS   Creatinine mildly elevated:  F/U with PCP   HbA1C 5.9% ( pre-diabetic range ):  F/U with PCP   Low Vitamin D:  Start 2000 IU Vitamin D3 per day, voicemail not set up and could not leave message, unable to reach letter sent

## 2023-08-15 ENCOUNTER — APPOINTMENT (OUTPATIENT)
Dept: GENERAL RADIOLOGY | Age: 33
End: 2023-08-15
Payer: MEDICAID

## 2023-08-15 ENCOUNTER — HOSPITAL ENCOUNTER (EMERGENCY)
Age: 33
Discharge: HOME | End: 2023-08-15
Attending: GENERAL PRACTICE
Payer: MEDICAID

## 2023-08-15 VITALS
WEIGHT: 185 LBS | HEART RATE: 71 BPM | HEIGHT: 63 IN | RESPIRATION RATE: 30 BRPM | OXYGEN SATURATION: 100 % | SYSTOLIC BLOOD PRESSURE: 109 MMHG | DIASTOLIC BLOOD PRESSURE: 73 MMHG | BODY MASS INDEX: 32.78 KG/M2 | TEMPERATURE: 97.6 F

## 2023-08-15 DIAGNOSIS — R05.1 ACUTE COUGH: ICD-10-CM

## 2023-08-15 DIAGNOSIS — R07.9 CHEST PAIN, UNSPECIFIED TYPE: Primary | ICD-10-CM

## 2023-08-15 PROCEDURE — 71045 X-RAY EXAM CHEST 1 VIEW: CPT

## 2023-08-15 PROCEDURE — 93005 ELECTROCARDIOGRAM TRACING: CPT | Performed by: GENERAL PRACTICE

## 2023-08-15 PROCEDURE — 99284 EMERGENCY DEPT VISIT MOD MDM: CPT

## 2023-08-15 RX ORDER — ONDANSETRON 4 MG/1
4 TABLET, FILM COATED ORAL EVERY 8 HOURS PRN
Qty: 20 TABLET | Refills: 0 | Status: SHIPPED | OUTPATIENT
Start: 2023-08-15

## 2023-08-15 RX ORDER — BENZONATATE 200 MG/1
200 CAPSULE ORAL 3 TIMES DAILY PRN
Qty: 30 CAPSULE | Refills: 0 | Status: SHIPPED | OUTPATIENT
Start: 2023-08-15 | End: 2023-08-22

## 2023-08-15 ASSESSMENT — PAIN DESCRIPTION - ORIENTATION: ORIENTATION: MID

## 2023-08-15 ASSESSMENT — PAIN - FUNCTIONAL ASSESSMENT: PAIN_FUNCTIONAL_ASSESSMENT: 0-10

## 2023-08-15 ASSESSMENT — PAIN DESCRIPTION - LOCATION: LOCATION: CHEST

## 2023-08-15 ASSESSMENT — PAIN SCALES - GENERAL: PAINLEVEL_OUTOF10: 8

## 2023-08-15 NOTE — ED PROVIDER NOTES
[facility] ED  Emergency Department Encounter  Emergency Medicine Attending     Pt Paulette Kahn  MRN: [id number]  [address] [facility] [place] 1990  Date of evaluation: 8/15/23  PCP:  No primary care provider on file. CHIEF COMPLAINT       Chief Complaint   Patient presents with    Chest Pain     Chest pain and shortness of breath x3 days. Pt states she has been coughing up blood today. HISTORY OF PRESENT ILLNESS  (Location/Symptom, Timing/Onset, Context/Setting, Quality, Duration, Modifying Factors, Severity.)      Mary Barnes is a 34 y.o. female who presents with with chest pain and cough with shortness of breath over the past several days. She is an active every day smoker. No fevers no chills, no nausea no vomiting. Decreased appetite. Reporting myalgias    PAST MEDICAL / SURGICAL / SOCIAL / FAMILY HISTORY      has a past medical history of Abnormal Pap smear of cervix, Anxiety, Depression, Headache, and Pneumonia. Denies further past medical hx     has a past surgical history that includes Tympanostomy tube placement.   Denies further past surgical hx    Social History     Socioeconomic History    Marital status: Single     Spouse name: Not on file    Number of children: Not on file    Years of education: Not on file    Highest education level: Not on file   Occupational History    Occupation: employed     Comment: full time   Tobacco Use    Smoking status: Every Day     Packs/day: 1.00     Years: 10.00     Pack years: 10.00     Types: Cigarettes    Smokeless tobacco: Never   Vaping Use    Vaping Use: Never used   Substance and Sexual Activity    Alcohol use: Yes     Comment: occasional    Drug use: Yes     Frequency: 7.0 times per week     Types: Marijuana [place]ira Sida)     Comment: daily    Sexual activity: Never   Other Topics Concern    Not on file   Social History Narrative    Not on file     Social Determinants of Health     Financial Resource Strain: Low Risk

## 2023-08-16 LAB
EKG ATRIAL RATE: 62 BPM
EKG P AXIS: 34 DEGREES
EKG P-R INTERVAL: 154 MS
EKG Q-T INTERVAL: 390 MS
EKG QRS DURATION: 86 MS
EKG QTC CALCULATION (BAZETT): 395 MS
EKG R AXIS: -29 DEGREES
EKG T AXIS: -5 DEGREES
EKG VENTRICULAR RATE: 62 BPM

## 2023-08-16 PROCEDURE — 93010 ELECTROCARDIOGRAM REPORT: CPT | Performed by: INTERNAL MEDICINE

## 2023-09-23 ENCOUNTER — HOSPITAL ENCOUNTER (INPATIENT)
Age: 33
LOS: 5 days | Discharge: HOME | DRG: 753 | End: 2023-09-28
Attending: PSYCHIATRY & NEUROLOGY | Admitting: PSYCHIATRY & NEUROLOGY
Payer: MEDICAID

## 2023-09-23 DIAGNOSIS — F32.9 MAJOR DEPRESSIVE DISORDER, REMISSION STATUS UNSPECIFIED, UNSPECIFIED WHETHER RECURRENT: Primary | ICD-10-CM

## 2023-09-23 DIAGNOSIS — F31.9 BIPOLAR 1 DISORDER, DEPRESSED (HCC): ICD-10-CM

## 2023-09-23 DIAGNOSIS — R45.851 SUICIDE IDEATION: ICD-10-CM

## 2023-09-23 LAB
ALBUMIN SERPL-MCNC: 4.2 G/DL (ref 3.5–4.6)
ALP SERPL-CCNC: 60 U/L (ref 40–130)
ALT SERPL-CCNC: 12 U/L (ref 0–33)
AMPHET UR QL SCN: ABNORMAL
ANION GAP SERPL CALCULATED.3IONS-SCNC: 13 MEQ/L (ref 9–15)
ANISOCYTOSIS BLD QL SMEAR: ABNORMAL
APAP SERPL-MCNC: <5 UG/ML (ref 10–30)
AST SERPL-CCNC: 19 U/L (ref 0–35)
BARBITURATES UR QL SCN: ABNORMAL
BASOPHILS # BLD: 0.1 K/UL (ref 0–0.2)
BASOPHILS NFR BLD: 0.7 %
BENZODIAZ UR QL SCN: ABNORMAL
BILIRUB SERPL-MCNC: <0.2 MG/DL (ref 0.2–0.7)
BILIRUB UR QL STRIP: NEGATIVE
BUN SERPL-MCNC: 8 MG/DL (ref 6–20)
CALCIUM SERPL-MCNC: 8.3 MG/DL (ref 8.5–9.9)
CANNABINOIDS UR QL SCN: POSITIVE
CHLORIDE SERPL-SCNC: 108 MEQ/L (ref 95–107)
CHOLEST SERPL-MCNC: 165 MG/DL (ref 0–199)
CK SERPL-CCNC: 192 U/L (ref 0–170)
CLARITY UR: CLEAR
CO2 SERPL-SCNC: 21 MEQ/L (ref 20–31)
COCAINE UR QL SCN: ABNORMAL
COLOR UR: YELLOW
CREAT SERPL-MCNC: 0.82 MG/DL (ref 0.5–0.9)
DRUG SCREEN COMMENT UR-IMP: ABNORMAL
EOSINOPHIL # BLD: 0 K/UL (ref 0–0.7)
EOSINOPHIL NFR BLD: 0.5 %
ERYTHROCYTE [DISTWIDTH] IN BLOOD BY AUTOMATED COUNT: 17.7 % (ref 11.5–14.5)
ETHANOL PERCENT: 0.05 G/DL
ETHANOLAMINE SERPL-MCNC: 52 MG/DL (ref 0–0.08)
FENTANYL SCREEN, URINE: ABNORMAL
GLOBULIN SER CALC-MCNC: 2.5 G/DL (ref 2.3–3.5)
GLUCOSE SERPL-MCNC: 85 MG/DL (ref 70–99)
GLUCOSE UR STRIP-MCNC: NEGATIVE MG/DL
HCG UR QL: NEGATIVE
HCT VFR BLD AUTO: 38 % (ref 37–47)
HDLC SERPL-MCNC: 53 MG/DL (ref 40–59)
HGB BLD-MCNC: 12.1 G/DL (ref 12–16)
HGB UR QL STRIP: NEGATIVE
KETONES UR STRIP-MCNC: NEGATIVE MG/DL
LDLC SERPL CALC-MCNC: 88 MG/DL (ref 0–129)
LEUKOCYTE ESTERASE UR QL STRIP: NEGATIVE
LYMPHOCYTES # BLD: 2.5 K/UL (ref 1–4.8)
LYMPHOCYTES NFR BLD: 30 %
MCH RBC QN AUTO: 23.2 PG (ref 27–31.3)
MCHC RBC AUTO-ENTMCNC: 31.8 % (ref 33–37)
MCV RBC AUTO: 72.8 FL (ref 79.4–94.8)
METHADONE UR QL SCN: ABNORMAL
MICROCYTES BLD QL SMEAR: ABNORMAL
MONOCYTES # BLD: 0.5 K/UL (ref 0.2–0.8)
MONOCYTES NFR BLD: 5.3 %
NEUTROPHILS # BLD: 5.3 K/UL (ref 1.4–6.5)
NEUTS SEG NFR BLD: 63.3 %
NITRITE UR QL STRIP: NEGATIVE
OPIATES UR QL SCN: ABNORMAL
OXYCODONE UR QL SCN: ABNORMAL
PCP UR QL SCN: ABNORMAL
PH UR STRIP: 7 [PH] (ref 5–9)
PLATELET # BLD AUTO: 337 K/UL (ref 130–400)
PLATELET BLD QL SMEAR: ADEQUATE
POIKILOCYTOSIS BLD QL SMEAR: ABNORMAL
POTASSIUM SERPL-SCNC: 3.7 MEQ/L (ref 3.4–4.9)
PROPOXYPH UR QL SCN: ABNORMAL
PROT SERPL-MCNC: 6.7 G/DL (ref 6.3–8)
PROT UR STRIP-MCNC: NEGATIVE MG/DL
RBC # BLD AUTO: 5.22 M/UL (ref 4.2–5.4)
SALICYLATES SERPL-MCNC: <0.3 MG/DL (ref 15–30)
SLIDE REVIEW: ABNORMAL
SODIUM SERPL-SCNC: 142 MEQ/L (ref 135–144)
SP GR UR STRIP: 1.02 (ref 1–1.03)
TARGETS BLD QL SMEAR: ABNORMAL
TRIGL SERPL-MCNC: 118 MG/DL (ref 0–150)
TSH SERPL-MCNC: 0.58 UIU/ML (ref 0.44–3.86)
URINE REFLEX TO CULTURE: NORMAL
UROBILINOGEN UR STRIP-ACNC: 0.2 E.U./DL
WBC # BLD AUTO: 8.4 K/UL (ref 4.8–10.8)

## 2023-09-23 PROCEDURE — 1240000000 HC EMOTIONAL WELLNESS R&B

## 2023-09-23 PROCEDURE — 6370000000 HC RX 637 (ALT 250 FOR IP): Performed by: PSYCHIATRY & NEUROLOGY

## 2023-09-23 PROCEDURE — 85025 COMPLETE CBC W/AUTO DIFF WBC: CPT

## 2023-09-23 PROCEDURE — 84443 ASSAY THYROID STIM HORMONE: CPT

## 2023-09-23 PROCEDURE — 82550 ASSAY OF CK (CPK): CPT

## 2023-09-23 PROCEDURE — 80061 LIPID PANEL: CPT

## 2023-09-23 PROCEDURE — 80307 DRUG TEST PRSMV CHEM ANLYZR: CPT

## 2023-09-23 PROCEDURE — 80179 DRUG ASSAY SALICYLATE: CPT

## 2023-09-23 PROCEDURE — 99285 EMERGENCY DEPT VISIT HI MDM: CPT

## 2023-09-23 PROCEDURE — 81003 URINALYSIS AUTO W/O SCOPE: CPT

## 2023-09-23 PROCEDURE — 84703 CHORIONIC GONADOTROPIN ASSAY: CPT

## 2023-09-23 PROCEDURE — 36415 COLL VENOUS BLD VENIPUNCTURE: CPT

## 2023-09-23 PROCEDURE — 80053 COMPREHEN METABOLIC PANEL: CPT

## 2023-09-23 PROCEDURE — 82077 ASSAY SPEC XCP UR&BREATH IA: CPT

## 2023-09-23 PROCEDURE — 80143 DRUG ASSAY ACETAMINOPHEN: CPT

## 2023-09-23 RX ORDER — MAGNESIUM HYDROXIDE/ALUMINUM HYDROXICE/SIMETHICONE 120; 1200; 1200 MG/30ML; MG/30ML; MG/30ML
30 SUSPENSION ORAL PRN
Status: DISCONTINUED | OUTPATIENT
Start: 2023-09-23 | End: 2023-09-28 | Stop reason: HOSPADM

## 2023-09-23 RX ORDER — TRAZODONE HYDROCHLORIDE 50 MG/1
50 TABLET ORAL NIGHTLY PRN
Status: DISCONTINUED | OUTPATIENT
Start: 2023-09-23 | End: 2023-09-28 | Stop reason: HOSPADM

## 2023-09-23 RX ORDER — HYDROXYZINE PAMOATE 50 MG/1
50 CAPSULE ORAL EVERY 6 HOURS PRN
Status: DISCONTINUED | OUTPATIENT
Start: 2023-09-23 | End: 2023-09-28 | Stop reason: HOSPADM

## 2023-09-23 RX ORDER — BENZTROPINE MESYLATE 1 MG/ML
2 INJECTION INTRAMUSCULAR; INTRAVENOUS 2 TIMES DAILY PRN
Status: DISCONTINUED | OUTPATIENT
Start: 2023-09-23 | End: 2023-09-28 | Stop reason: HOSPADM

## 2023-09-23 RX ORDER — POLYETHYLENE GLYCOL 3350 17 G
2 POWDER IN PACKET (EA) ORAL
Status: DISCONTINUED | OUTPATIENT
Start: 2023-09-23 | End: 2023-09-28 | Stop reason: HOSPADM

## 2023-09-23 RX ORDER — ACETAMINOPHEN 325 MG/1
650 TABLET ORAL EVERY 4 HOURS PRN
Status: DISCONTINUED | OUTPATIENT
Start: 2023-09-23 | End: 2023-09-28 | Stop reason: HOSPADM

## 2023-09-23 RX ORDER — HALOPERIDOL 5 MG/ML
5 INJECTION INTRAMUSCULAR EVERY 6 HOURS PRN
Status: DISCONTINUED | OUTPATIENT
Start: 2023-09-23 | End: 2023-09-28 | Stop reason: HOSPADM

## 2023-09-23 RX ORDER — HALOPERIDOL 5 MG/1
5 TABLET ORAL EVERY 6 HOURS PRN
Status: DISCONTINUED | OUTPATIENT
Start: 2023-09-23 | End: 2023-09-28 | Stop reason: HOSPADM

## 2023-09-23 RX ORDER — HYDROXYZINE HYDROCHLORIDE 50 MG/ML
50 INJECTION, SOLUTION INTRAMUSCULAR EVERY 6 HOURS PRN
Status: DISCONTINUED | OUTPATIENT
Start: 2023-09-23 | End: 2023-09-28 | Stop reason: HOSPADM

## 2023-09-23 RX ADMIN — TRAZODONE HYDROCHLORIDE 50 MG: 50 TABLET ORAL at 23:52

## 2023-09-23 ASSESSMENT — ENCOUNTER SYMPTOMS
VOMITING: 0
RHINORRHEA: 0
SHORTNESS OF BREATH: 0
COUGH: 0
EYE PAIN: 0
SORE THROAT: 0
DIARRHEA: 0
NAUSEA: 0
PHOTOPHOBIA: 0
ABDOMINAL PAIN: 0
BACK PAIN: 0

## 2023-09-23 ASSESSMENT — PATIENT HEALTH QUESTIONNAIRE - PHQ9
2. FEELING DOWN, DEPRESSED OR HOPELESS: 1
SUM OF ALL RESPONSES TO PHQ QUESTIONS 1-9: 19
8. MOVING OR SPEAKING SO SLOWLY THAT OTHER PEOPLE COULD HAVE NOTICED. OR THE OPPOSITE, BEING SO FIGETY OR RESTLESS THAT YOU HAVE BEEN MOVING AROUND A LOT MORE THAN USUAL: 0
SUM OF ALL RESPONSES TO PHQ QUESTIONS 1-9: 19
3. TROUBLE FALLING OR STAYING ASLEEP: 3
SUM OF ALL RESPONSES TO PHQ QUESTIONS 1-9: 19
SUM OF ALL RESPONSES TO PHQ9 QUESTIONS 1 & 2: 4
4. FEELING TIRED OR HAVING LITTLE ENERGY: 3
9. THOUGHTS THAT YOU WOULD BE BETTER OFF DEAD, OR OF HURTING YOURSELF: 2
1. LITTLE INTEREST OR PLEASURE IN DOING THINGS: 3
7. TROUBLE CONCENTRATING ON THINGS, SUCH AS READING THE NEWSPAPER OR WATCHING TELEVISION: 3
5. POOR APPETITE OR OVEREATING: 3
10. IF YOU CHECKED OFF ANY PROBLEMS, HOW DIFFICULT HAVE THESE PROBLEMS MADE IT FOR YOU TO DO YOUR WORK, TAKE CARE OF THINGS AT HOME, OR GET ALONG WITH OTHER PEOPLE: 3
SUM OF ALL RESPONSES TO PHQ QUESTIONS 1-9: 17
6. FEELING BAD ABOUT YOURSELF - OR THAT YOU ARE A FAILURE OR HAVE LET YOURSELF OR YOUR FAMILY DOWN: 1

## 2023-09-23 ASSESSMENT — PAIN - FUNCTIONAL ASSESSMENT: PAIN_FUNCTIONAL_ASSESSMENT: 0-10

## 2023-09-23 ASSESSMENT — PAIN SCALES - GENERAL: PAINLEVEL_OUTOF10: 0

## 2023-09-23 NOTE — ED PROVIDER NOTES
[address] [facility]  EMERGENCY DEPARTMENT ENCOUNTER      Pt Name: Tolu Kelley  MRN: [id number]  [address] [facility] 1990  Date of evaluation: 9/23/2023  Provider: Lauren Mera PA-C      HISTORY OF PRESENT ILLNESS    Mary Rojas is a 34 y.o. female who presents to the Emergency Department with suicidal. Pt is called EMS because she is having thoughts of harming herself. She states that she has been very overwhelmed lately. She does have a history of depression states she supposed be on medication but she has not always taken if she is supposed to. She did have a plan to tie scarf around her neck. REVIEW OF SYSTEMS       Review of Systems   Constitutional:  Negative for chills, diaphoresis, fatigue and fever. HENT:  Negative for congestion, rhinorrhea and sore throat. Eyes:  Negative for photophobia and pain. Respiratory:  Negative for cough and shortness of breath. Cardiovascular:  Negative for chest pain and palpitations. Gastrointestinal:  Negative for abdominal pain, diarrhea, nausea and vomiting. Genitourinary:  Negative for dysuria and flank pain. Musculoskeletal:  Negative for back pain. Skin:  Negative for rash. Neurological:  Negative for dizziness, light-headedness and headaches. Psychiatric/Behavioral:  Positive for suicidal ideas. All other systems reviewed and are negative.         PAST MEDICAL HISTORY     Past Medical History:   Diagnosis Date    Abnormal Pap smear of cervix     Anxiety     Depression     Headache     Pneumonia          SURGICAL HISTORY       Past Surgical History:   Procedure Laterality Date    TYMPANOSTOMY TUBE PLACEMENT           CURRENT MEDICATIONS       Current Discharge Medication List        CONTINUE these medications which have NOT CHANGED    Details   ondansetron (ZOFRAN) 4 MG tablet Take 1 tablet by mouth every 8 hours as needed for Nausea  Qty: 20 tablet, Refills: 0      Cholecalciferol (VITAMIN D) 50 MCG (2000 UT) CAPS capsule Take

## 2023-09-24 PROCEDURE — 1240000000 HC EMOTIONAL WELLNESS R&B

## 2023-09-24 PROCEDURE — 6370000000 HC RX 637 (ALT 250 FOR IP): Performed by: PSYCHIATRY & NEUROLOGY

## 2023-09-24 RX ORDER — QUETIAPINE FUMARATE 50 MG/1
50 TABLET, FILM COATED ORAL
Status: COMPLETED | OUTPATIENT
Start: 2023-09-24 | End: 2023-09-25

## 2023-09-24 RX ORDER — DIVALPROEX SODIUM 125 MG/1
125 CAPSULE, COATED PELLETS ORAL EVERY 12 HOURS SCHEDULED
Status: DISCONTINUED | OUTPATIENT
Start: 2023-09-24 | End: 2023-09-25

## 2023-09-24 RX ORDER — QUETIAPINE FUMARATE 25 MG/1
25 TABLET, FILM COATED ORAL 2 TIMES DAILY
Status: DISCONTINUED | OUTPATIENT
Start: 2023-09-24 | End: 2023-09-25

## 2023-09-24 RX ORDER — POLYETHYLENE GLYCOL 3350 17 G
2 POWDER IN PACKET (EA) ORAL
Status: DISCONTINUED | OUTPATIENT
Start: 2023-09-24 | End: 2023-09-24 | Stop reason: SDUPTHER

## 2023-09-24 RX ADMIN — QUETIAPINE FUMARATE 25 MG: 25 TABLET ORAL at 13:23

## 2023-09-24 RX ADMIN — ACETAMINOPHEN 650 MG: 325 TABLET ORAL at 09:11

## 2023-09-24 RX ADMIN — HYDROXYZINE PAMOATE 50 MG: 50 CAPSULE ORAL at 09:11

## 2023-09-24 RX ADMIN — HYDROXYZINE PAMOATE 50 MG: 50 CAPSULE ORAL at 20:07

## 2023-09-24 RX ADMIN — QUETIAPINE FUMARATE 25 MG: 25 TABLET ORAL at 20:07

## 2023-09-24 RX ADMIN — TRAZODONE HYDROCHLORIDE 50 MG: 50 TABLET ORAL at 23:41

## 2023-09-24 RX ADMIN — DIVALPROEX SODIUM 125 MG: 125 CAPSULE, COATED PELLETS ORAL at 20:21

## 2023-09-24 ASSESSMENT — PAIN SCALES - GENERAL: PAINLEVEL_OUTOF10: 7

## 2023-09-24 ASSESSMENT — SLEEP AND FATIGUE QUESTIONNAIRES
DO YOU USE A SLEEP AID: NO
SLEEP PATTERN: DIFFICULTY FALLING ASLEEP;RESTLESSNESS;DISTURBED/INTERRUPTED SLEEP
AVERAGE NUMBER OF SLEEP HOURS: 7
DO YOU HAVE DIFFICULTY SLEEPING: YES

## 2023-09-24 ASSESSMENT — PAIN DESCRIPTION - LOCATION: LOCATION: HEAD

## 2023-09-24 ASSESSMENT — PAIN DESCRIPTION - DESCRIPTORS: DESCRIPTORS: THROBBING

## 2023-09-24 NOTE — PLAN OF CARE
Problem: Pain  Goal: Verbalizes/displays adequate comfort level or baseline comfort level  Outcome: Progressing     Problem: Risk for Elopement  Goal: Patient will not exit the unit/facility without proper excort  9/24/2023 0134 by Vikas Lowry RN  Outcome: Progressing     Problem: Anxiety  Goal: Will report anxiety at manageable levels  Description: INTERVENTIONS:  1. Administer medication as ordered  2. Teach and rehearse alternative coping skills  3. Provide emotional support with 1:1 interaction with staff  9/24/2023 0134 by Vikas Lowry RN  Outcome: Progressing     Problem: Depression/Self Harm  Goal: Effect of psychiatric condition will be minimized and patient will be protected from self harm  Description: INTERVENTIONS:  1. Assess impact of patient's symptoms on level of functioning, self care needs and offer support as indicated  2. Assess patient/family knowledge of depression, impact on illness and need for teaching  3. Provide emotional support, presence and reassurance  4. Assess for possible suicidal thoughts or ideation. If patient expresses suicidal thoughts or statements do not leave alone, initiate Suicide Precautions, move to a room close to the nursing station and obtain sitter  5.  Initiate consults as appropriate with Mental Health Professional, Spiritual Care, Psychosocial CNS, and consider a recommendation to the [facility] for a Psychiatric Consultation  9/24/2023 0134 by Vikas Lowry RN  Outcome: Progressing

## 2023-09-24 NOTE — CARE COORDINATION
FAMILY COLLATERAL NOTE    Family/Support Name: Ion Simms  Contact #:[phone]  Relationship to Pt[de-identified] mother        Family/Support contact aware of hospitalization: Yes  Presenting Symptoms/Current Concerns:  \"Cant rest. Very shaky, on the verge of a nervous break-down. \"    Top 3 Life Stressors:   Brother passed away   Recent break up with fiance      Background History Relevant to Current Hospitalization:  2019 with [facility]  Mother reports pt has been hospitalized 3 times prior to this- 2019, once in [address] [place] [address] and another time in [facility] but unsure. Mother reports the pt. Has been off her medication for 2 years or more and no counseling services. Family Mental Health/Substance Use History: PTSD- mother  Father- unknown   No substance abuse on maternal side  Substance abuse on paternal side (indirect)        Support Network's Goal for Hospitalization: Needs counseling, . \"The whole plan\" per mother. Discharge Plan: mother will pick pt up and return her to her own home. If the pt wants to stay with mother for a few days she is able. TT to discuss, pt will d/c with follow up care at [facility]. Support Network Supportive of Discharge Plan: Yes      Support can confirm Safety of Location and Security of Weapons: none      Support agreeable to [employer] and Monitor Medications (including Prescription and OTC): yes    Identified Barriers to Compliance with Discharge Plan:   None known    Recommendations for Support Network:   Encourage pt to stay compliant with treatment at [facility].    Encourage grief therapy in a group setting with family to help process recent loss of brother  Valeriano Tirado [place]

## 2023-09-24 NOTE — CARE COORDINATION
Called pt workplace per request- [facility] [employer] (memory care)- [address]. Requested to speak to a nurse. Spoke with Kinsey Mejias - caregiver  Reported that pt is in the hospital currently and will return to work with a official letter.

## 2023-09-24 NOTE — PLAN OF CARE
Problem: Pain  Goal: Verbalizes/displays adequate comfort level or baseline comfort level  9/24/2023 0825 by Kemi Neil RN  Outcome: Progressing  9/24/2023 0134 by Kassi Key RN  Outcome: Progressing     Problem: Risk for Elopement  Goal: Patient will not exit the unit/facility without proper excort  9/24/2023 0825 by Kemi Neil RN  Outcome: Progressing  Flowsheets (Taken 9/24/2023 6398)  Nursing Interventions for Elopement Risk: Communicate to physician the risk for elopement  9/24/2023 0134 by Kassi Key RN  Outcome: Progressing  9/24/2023 0133 by Kassi Key RN  Flowsheets (Taken 9/24/2023 0133)  Nursing Interventions for Elopement Risk:   Collaborate with treatment team for nicotine replacement   Communicate/escalate to charge nurse the risk of elopement     Problem: Anxiety  Goal: Will report anxiety at manageable levels  Description: INTERVENTIONS:  1. Administer medication as ordered  2. Teach and rehearse alternative coping skills  3. Provide emotional support with 1:1 interaction with staff  9/24/2023 0825 by Kemi Neil RN  Outcome: Progressing  Flowsheets (Taken 9/24/2023 1274)  Will report anxiety at manageable levels:   Provide emotional support with 1:1 interaction with staff   Administer medication as ordered   Teach and rehearse alternative coping skills  9/24/2023 0134 by Kassi Key RN  Outcome: Progressing  9/24/2023 0133 by Kassi Key RN  Flowsheets (Taken 9/24/2023 0133)  Will report anxiety at manageable levels:   Administer medication as ordered   Teach and rehearse alternative coping skills   Provide emotional support with 1:1 interaction with staff     Problem: Depression/Self Harm  Goal: Effect of psychiatric condition will be minimized and patient will be protected from self harm  Description: INTERVENTIONS:  1. Assess impact of patient's symptoms on level of functioning, self care needs and offer support as indicated  2.  Assess patient/family
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
## 2023-09-24 NOTE — CONSULTS
Result Value Ref Range    WBC 8.4 4.8 - 10.8 K/uL    RBC 5.22 4.20 - 5.40 M/uL    Hemoglobin 12.1 12.0 - 16.0 g/dL    Hematocrit 38.0 37.0 - 47.0 %    MCV 72.8 (L) 79.4 - 94.8 fL    MCH 23.2 (L) 27.0 - 31.3 pg    MCHC 31.8 (L) 33.0 - 37.0 %    RDW 17.7 (H) 11.5 - 14.5 %    Platelets 178 385 - 546 K/uL    PLATELET SLIDE REVIEW Adequate     SLIDE REVIEW see below     Neutrophils % 63.3 %    Lymphocytes % 30.0 %    Monocytes % 5.3 %    Eosinophils % 0.5 %    Basophils % 0.7 %    Neutrophils Absolute 5.3 1.4 - 6.5 K/uL    Lymphocytes Absolute 2.5 1.0 - 4.8 K/uL    Monocytes Absolute 0.5 0.2 - 0.8 K/uL    Eosinophils Absolute 0.0 0.0 - 0.7 K/uL    Basophils Absolute 0.1 0.0 - 0.2 K/uL    Anisocytosis 1+     Microcytes 1+     Poikilocytes 1+     Target Cells 1+    CK    Collection Time: 09/23/23  4:28 PM   Result Value Ref Range    Total  (H) 0 - 170 U/L   Comprehensive Metabolic Panel    Collection Time: 09/23/23  4:28 PM   Result Value Ref Range    Sodium 142 135 - 144 mEq/L    Potassium 3.7 3.4 - 4.9 mEq/L    Chloride 108 (H) 95 - 107 mEq/L    CO2 21 20 - 31 mEq/L    Anion Gap 13 9 - 15 mEq/L    Glucose 85 70 - 99 mg/dL    BUN 8 6 - 20 mg/dL    Creatinine 0.82 0.50 - 0.90 mg/dL    Est, Glom Filt Rate >60.0 >60    Calcium 8.3 (L) 8.5 - 9.9 mg/dL    Total Protein 6.7 6.3 - 8.0 g/dL    Albumin 4.2 3.5 - 4.6 g/dL    Total Bilirubin <0.2 0.2 - 0.7 mg/dL    Alkaline Phosphatase 60 40 - 130 U/L    ALT 12 0 - 33 U/L    AST 19 0 - 35 U/L    Globulin 2.5 2.3 - 3.5 g/dL   Lipid Panel    Collection Time: 09/23/23  4:28 PM   Result Value Ref Range    Cholesterol, Total 165 0 - 199 mg/dL    Triglycerides 118 0 - 150 mg/dL    HDL 53 40 - 59 mg/dL    LDL Calculated 88 0 - 734 mg/dL   Salicylate    Collection Time: 09/23/23  4:28 PM   Result Value Ref Range    Salicylate, Serum <2.2 (L) 15.0 - 30.0 mg/dL   TSH    Collection Time: 09/23/23  4:28 PM   Result Value Ref Range    TSH 0.578 0.440 - 3.860 uIU/mL   Ethanol
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
## 2023-09-25 PROCEDURE — 1240000000 HC EMOTIONAL WELLNESS R&B

## 2023-09-25 PROCEDURE — 6370000000 HC RX 637 (ALT 250 FOR IP): Performed by: PSYCHIATRY & NEUROLOGY

## 2023-09-25 RX ORDER — QUETIAPINE FUMARATE 100 MG/1
100 TABLET, FILM COATED ORAL NIGHTLY
Status: DISCONTINUED | OUTPATIENT
Start: 2023-09-25 | End: 2023-09-26

## 2023-09-25 RX ORDER — BUSPIRONE HYDROCHLORIDE 10 MG/1
10 TABLET ORAL 3 TIMES DAILY
Status: DISCONTINUED | OUTPATIENT
Start: 2023-09-25 | End: 2023-09-28 | Stop reason: HOSPADM

## 2023-09-25 RX ORDER — QUETIAPINE FUMARATE 25 MG/1
25 TABLET, FILM COATED ORAL 2 TIMES DAILY
Status: DISCONTINUED | OUTPATIENT
Start: 2023-09-25 | End: 2023-09-28 | Stop reason: HOSPADM

## 2023-09-25 RX ORDER — DIVALPROEX SODIUM 500 MG/1
500 TABLET, DELAYED RELEASE ORAL EVERY 12 HOURS SCHEDULED
Status: DISCONTINUED | OUTPATIENT
Start: 2023-09-25 | End: 2023-09-28 | Stop reason: HOSPADM

## 2023-09-25 RX ADMIN — BUSPIRONE HYDROCHLORIDE 10 MG: 10 TABLET ORAL at 21:10

## 2023-09-25 RX ADMIN — QUETIAPINE FUMARATE 50 MG: 50 TABLET ORAL at 00:23

## 2023-09-25 RX ADMIN — HYDROXYZINE PAMOATE 50 MG: 50 CAPSULE ORAL at 14:33

## 2023-09-25 RX ADMIN — QUETIAPINE FUMARATE 25 MG: 25 TABLET ORAL at 08:22

## 2023-09-25 RX ADMIN — QUETIAPINE FUMARATE 100 MG: 100 TABLET ORAL at 21:10

## 2023-09-25 RX ADMIN — HYDROXYZINE PAMOATE 50 MG: 50 CAPSULE ORAL at 08:37

## 2023-09-25 RX ADMIN — DIVALPROEX SODIUM 500 MG: 500 TABLET, DELAYED RELEASE ORAL at 21:10

## 2023-09-25 RX ADMIN — QUETIAPINE FUMARATE 25 MG: 25 TABLET ORAL at 13:30

## 2023-09-25 RX ADMIN — DIVALPROEX SODIUM 125 MG: 125 CAPSULE, COATED PELLETS ORAL at 08:21

## 2023-09-25 RX ADMIN — BUSPIRONE HYDROCHLORIDE 10 MG: 10 TABLET ORAL at 13:30

## 2023-09-25 NOTE — GROUP NOTE
Group Therapy Note    Date: 9/25/2023    Group Start Time: 1330  Group End Time: 1400  Group Topic: Healthy Living/Wellness    [facility]    Richelle Racer        Group Therapy Note    Attendees: 8/21       Patient's Goal:  Attend group with nursing students learning about and discussing seasonal depression. Notes:  Patient attended.

## 2023-09-25 NOTE — PLAN OF CARE
Patient isolative and withdrawn to her room. Pt remains with sitter per Dr. Zac Rashid order for suicide risk. Pt affect is flat and non-reactive. She went to bed early and was non-social on unit while awake. Pt denies active suicidal ideation. She rates her anxiety 10/10 and depression 7/10 (with 10 being the highest) Pt approached nurses station at [phone] and complained of not being able to sleep on the prescribed 25 mg of Seroquel. Patient giving incongruent information  to staff regarding her Seroquel. Patient initially reported not having a current prescription of Seroquel and taking small \"bites\" from an old prescription. Dr. Zac Rashid contacted and a x1 dose of 50 mg Seroquel received. Patient also accepted 50 mg of PRN Trazodone. Problem: Pain  Goal: Verbalizes/displays adequate comfort level or baseline comfort level  Outcome: Progressing     Problem: Risk for Elopement  Goal: Patient will not exit the unit/facility without proper excort  Outcome: Progressing     Problem: Anxiety  Goal: Will report anxiety at manageable levels  Description: INTERVENTIONS:  1. Administer medication as ordered  2. Teach and rehearse alternative coping skills  3. Provide emotional support with 1:1 interaction with staff  Outcome: Progressing     Problem: Depression/Self Harm  Goal: Effect of psychiatric condition will be minimized and patient will be protected from self harm  Description: INTERVENTIONS:  1. Assess impact of patient's symptoms on level of functioning, self care needs and offer support as indicated  2. Assess patient/family knowledge of depression, impact on illness and need for teaching  3. Provide emotional support, presence and reassurance  4. Assess for possible suicidal thoughts or ideation. If patient expresses suicidal thoughts or statements do not leave alone, initiate Suicide Precautions, move to a room close to the nursing station and obtain sitter  5.  Initiate consults as appropriate with Mental Health

## 2023-09-25 NOTE — GROUP NOTE
Group Therapy Note    Date: 9/25/2023    Group Start Time: 7040  Group End Time: 1072  Group Topic: Healthy Living/Wellness    [facility]    [facility]        Group Therapy Note    Attendees: 10/22       Patient's Goal:  Goals     Notes:  pt attentive in group    Status After Intervention:  Unchanged    Participation Level:  Active Listener    Participation Quality: Appropriate, Attentive, Sharing, and Supportive      Speech:  normal      Thought Process/Content: Logical      Affective Functioning: Congruent      Mood: euphoric      Level of consciousness:  Alert and Oriented x4      Response to Learning: Able to verbalize current knowledge/experience, Able to verbalize/acknowledge new learning, Able to retain information, Capable of insight, Able to change behavior, and Progressing to goal      Endings: None Reported    Modes of Intervention: Support      Discipline Responsible: [address] [facility]      Signature:  [facility]

## 2023-09-25 NOTE — H&P
09/23/2023    GFRAA >60.0 01/12/2021    GLOB 2.5 09/23/2023       Lab Results   Component Value Date    WBC 8.4 09/23/2023    HGB 12.1 09/23/2023    HCT 38.0 09/23/2023    MCV 72.8 (L) 09/23/2023     09/23/2023    LYMPHOPCT 30.0 09/23/2023    RBC 5.22 09/23/2023    MCH 23.2 (L) 09/23/2023    MCHC 31.8 (L) 09/23/2023    RDW 17.7 (H) 09/23/2023     Lab Results   Component Value Date    TSH 0.578 09/23/2023    TSHREFLEX 1.400 07/10/2023     Lab Results   Component Value Date    [id number] 07/10/2023     Mental Status Examination:  Level of consciousness: awake and alert  Appearance:  well-appearing  Behavior/Motor:  psychomotor agitation  Attitude toward examiner:  cooperative  Speech:  spontaneous, normal rate, normal volume and well articulated  Mood:  Still anxious and sad  Affect:  mood congruent  Thought processes:  goal directed  Thought content:  Homocidal ideation denies  Suicidal Ideation:  active nw/plan  Delusions:  none elicited  Perceptual Disturbance:  denies any perceptual disturbance  Cognition:  oriented to person, place, and time  Speech soft and whispered  Mildly distractible  Fair insight  Fair judgement    Assessment: patient presents with intense suicidal ideations. Does not feel she can control them  Suicide risk high    Diagnosis: Bipolar disorder depressed  R/O MDD recurrent  Plan Restart medications from the past  1:1 sitter at bedside.           .Heidi Chaidez MD

## 2023-09-25 NOTE — GROUP NOTE
Group Therapy Note    Date: 9/25/2023    Group Start Time: 1000  Group End Time: 1100  Group Topic: Psychoeducation    [facility]    [facility]        Group Therapy Note    Attendees: 16/22       Patient's Goal:  \"To go home\"    Notes:  Patient attended the 1000 skills group. Attentive and good participation. Status After Intervention:  Unchanged    Participation Level:  Active Listener    Participation Quality: Appropriate      Speech:  normal      Thought Process/Content: Linear      Affective Functioning: Congruent      Mood:  calm      Level of consciousness:  Alert      Response to Learning: Progressing to goal      Endings: None Reported    Modes of Intervention: Education, Socialization, and Activity      Discipline Responsible: Psychoeducational Specialist      Signature:  [facility]

## 2023-09-25 NOTE — PLAN OF CARE
Pt remains on 1:1 for safety and suicide precautions. Pt calm and cooperative. Complains of feeling shaky, restless, and having racing thoughts. States she slept poorly last night due to racing thoughts and nightmares. Rates anxiety 8/10 and depression 10/10 with 10 being the worst. Denies SI, HI, AVH. Contracts for safety on the unit. Pt watches TV with peers and social with sitter. Showered today. Problem: Pain  Goal: Verbalizes/displays adequate comfort level or baseline comfort level  9/25/2023 1144 by Abiel Epperson RN  Outcome: Progressing  9/25/2023 0023 by Maricel Singletary RN  Outcome: Progressing     Problem: Risk for Elopement  Goal: Patient will not exit the unit/facility without proper excort  9/25/2023 1144 by Abiel Epperson RN  Outcome: Progressing  Flowsheets (Taken 9/25/2023 1136)  Nursing Interventions for Elopement Risk: Reduce environmental triggers  9/25/2023 0023 by Maricel Singletary RN  Outcome: Progressing     Problem: Anxiety  Goal: Will report anxiety at manageable levels  Description: INTERVENTIONS:  1. Administer medication as ordered  2. Teach and rehearse alternative coping skills  3. Provide emotional support with 1:1 interaction with staff  9/25/2023 1144 by Abiel Epperson RN  Outcome: Progressing  Flowsheets (Taken 9/25/2023 1136)  Will report anxiety at manageable levels:   Administer medication as ordered   Provide emotional support with 1:1 interaction with staff   Teach and rehearse alternative coping skills  9/25/2023 0023 by Maricel Singletary RN  Outcome: Progressing     Problem: Depression/Self Harm  Goal: Effect of psychiatric condition will be minimized and patient will be protected from self harm  Description: INTERVENTIONS:  1. Assess impact of patient's symptoms on level of functioning, self care needs and offer support as indicated  2. Assess patient/family knowledge of depression, impact on illness and need for teaching  3.  Provide emotional support, presence and

## 2023-09-26 PROCEDURE — 1240000000 HC EMOTIONAL WELLNESS R&B

## 2023-09-26 PROCEDURE — 99232 SBSQ HOSP IP/OBS MODERATE 35: CPT | Performed by: PSYCHIATRY & NEUROLOGY

## 2023-09-26 PROCEDURE — 6370000000 HC RX 637 (ALT 250 FOR IP): Performed by: PSYCHIATRY & NEUROLOGY

## 2023-09-26 RX ORDER — ZOLPIDEM TARTRATE 5 MG/1
5 TABLET ORAL NIGHTLY
Status: DISCONTINUED | OUTPATIENT
Start: 2023-09-26 | End: 2023-09-28 | Stop reason: HOSPADM

## 2023-09-26 RX ADMIN — DIVALPROEX SODIUM 500 MG: 500 TABLET, DELAYED RELEASE ORAL at 20:30

## 2023-09-26 RX ADMIN — BUSPIRONE HYDROCHLORIDE 10 MG: 10 TABLET ORAL at 14:46

## 2023-09-26 RX ADMIN — ACETAMINOPHEN 650 MG: 325 TABLET ORAL at 09:09

## 2023-09-26 RX ADMIN — QUETIAPINE FUMARATE 25 MG: 25 TABLET ORAL at 09:10

## 2023-09-26 RX ADMIN — DIVALPROEX SODIUM 500 MG: 500 TABLET, DELAYED RELEASE ORAL at 09:10

## 2023-09-26 RX ADMIN — QUETIAPINE FUMARATE 25 MG: 25 TABLET ORAL at 14:45

## 2023-09-26 RX ADMIN — BUSPIRONE HYDROCHLORIDE 10 MG: 10 TABLET ORAL at 09:11

## 2023-09-26 RX ADMIN — BUSPIRONE HYDROCHLORIDE 10 MG: 10 TABLET ORAL at 20:30

## 2023-09-26 RX ADMIN — ZOLPIDEM TARTRATE 5 MG: 5 TABLET ORAL at 20:30

## 2023-09-26 RX ADMIN — HYDROXYZINE PAMOATE 50 MG: 50 CAPSULE ORAL at 12:54

## 2023-09-26 RX ADMIN — QUETIAPINE FUMARATE 150 MG: 100 TABLET ORAL at 20:30

## 2023-09-26 ASSESSMENT — PAIN SCALES - GENERAL: PAINLEVEL_OUTOF10: 6

## 2023-09-26 ASSESSMENT — PAIN DESCRIPTION - LOCATION: LOCATION: HEAD

## 2023-09-26 ASSESSMENT — PAIN DESCRIPTION - DESCRIPTORS: DESCRIPTORS: ACHING

## 2023-09-26 NOTE — GROUP NOTE
Group Therapy Note    Date: 9/26/2023    Group Start Time: 1330  Group End Time: 0294  Group Topic: Healthy Living/Wellness    [facility]    Bo Leal RN        Group Therapy Note    Attendees: 11/20       Patient's Goal:  Discuss coping skills and learn new skills.      Notes:  alert and appropriate     Status After Intervention:  Unchanged    Participation Level: Interactive    Participation Quality: Appropriate and Attentive      Speech:  normal      Thought Process/Content: Logical  Linear      Affective Functioning: Congruent      Mood: euthymic      Level of consciousness:  Alert and Oriented x4      Response to Learning: Able to verbalize current knowledge/experience and Able to verbalize/acknowledge new learning      Endings: None Reported    Modes of Intervention: Education and Support      Discipline Responsible: Registered Nurse      Signature:  Bo Leal RN

## 2023-09-26 NOTE — PLAN OF CARE
Problem: Pain  Goal: Verbalizes/displays adequate comfort level or baseline comfort level  Outcome: Progressing  Flowsheets (Taken 9/26/2023 1231)  Verbalizes/displays adequate comfort level or baseline comfort level:   Encourage patient to monitor pain and request assistance   Assess pain using appropriate pain scale   Administer analgesics based on type and severity of pain and evaluate response     Problem: Risk for Elopement  Goal: Patient will not exit the unit/facility without proper excort  Outcome: Progressing  Flowsheets (Taken 9/26/2023 1231)  Nursing Interventions for Elopement Risk:   Assist with personal care needs such as toileting, eating, dressing, as needed to reduce the risk of wandering   Collaborate with family members/caregivers to mitigate the elopement risk   Collaborate with treatment team for nicotine replacement   Reduce environmental triggers     Problem: Anxiety  Goal: Will report anxiety at manageable levels  Description: INTERVENTIONS:  1. Administer medication as ordered  2. Teach and rehearse alternative coping skills  3. Provide emotional support with 1:1 interaction with staff  Outcome: Progressing  Flowsheets (Taken 9/26/2023 1231)  Will report anxiety at manageable levels:   Administer medication as ordered   Provide emotional support with 1:1 interaction with staff     Problem: Depression/Self Harm  Goal: Effect of psychiatric condition will be minimized and patient will be protected from self harm  Description: INTERVENTIONS:  1. Assess impact of patient's symptoms on level of functioning, self care needs and offer support as indicated  2. Assess patient/family knowledge of depression, impact on illness and need for teaching  3. Provide emotional support, presence and reassurance  4. Assess for possible suicidal thoughts or ideation.  If patient expresses suicidal thoughts or statements do not leave alone, initiate Suicide Precautions, move to a room close to the nursing

## 2023-09-26 NOTE — GROUP NOTE
Group Therapy Note    Date: 9/25/2023    Group Start Time: 1930  Group End Time: 2015  Group Topic: Recreational    [facility]    [place]        Group Therapy Note    Attendees: 12/22       Patient's Goal:  Pictionary     Notes:  pt attentive in group    Status After Intervention:  Unchanged    Participation Level:  Active Listener    Participation Quality: Appropriate, Attentive, Sharing, and Supportive      Speech:  normal      Thought Process/Content: Logical      Affective Functioning: Congruent      Mood: euphoric      Level of consciousness:  Alert, Oriented x4, and Attentive      Response to Learning: Able to verbalize current knowledge/experience, Able to verbalize/acknowledge new learning, Able to retain information, Capable of insight, Able to change behavior, and Progressing to goal      Endings: None Reported    Modes of Intervention: Activity      Discipline Responsible: [address] [facility]      Signature:  [place]

## 2023-09-26 NOTE — GROUP NOTE
Group Therapy Note    Date: 9/25/2023    Group Start Time: 2100  Group End Time: 2130  Group Topic: Wrap-Up    [facility]    Iqra Maldonado        Group Therapy Note    Attendees: 15/22       Patient's Goal:  to go home     Notes:  pt attentive in group    Status After Intervention:  Unchanged    Participation Level:  Active Listener    Participation Quality: Appropriate, Attentive, Sharing, and Supportive      Speech:  normal      Thought Process/Content: Logical      Affective Functioning: Congruent      Mood: euphoric      Level of consciousness:  Alert, Oriented x4, and Attentive      Response to Learning: Able to verbalize current knowledge/experience, Able to verbalize/acknowledge new learning, Able to retain information, Capable of insight, Able to change behavior, and Progressing to goal      Endings: None Reported    Modes of Intervention: Socialization      Discipline Responsible: [address]      Signature:  Aguilar Carmichael

## 2023-09-26 NOTE — GROUP NOTE
Group Therapy Note    Date: 9/26/2023    Group Start Time: 1000  Group End Time: 1100  Group Topic: Psychoeducation    MLO[facility] 3W I    Benito Garcia        Group Therapy Note    Attendees:  14/23       Patient's Goal:  \"Get better and go home\"    Notes:  Patient attended the 1000 skills group. Patient was attentive and worked well on her task.      Status After Intervention:  Unchanged    Participation Level: Good    Participation Quality: Appropriate      Speech:  quiet      Thought Process/Content: Linear      Affective Functioning: Flat      Mood:  calm      Level of consciousness:  Alert      Response to Learning: Progressing to goal      Endings: None Reported    Modes of Intervention: Education, Socialization, and Activity      Discipline Responsible: Psychoeducational Specialist      Signature:  Benito Garcia

## 2023-09-26 NOTE — GROUP NOTE
Group Therapy Note    Date: 9/26/2023    Group Start Time: 1100  Group End Time: 1200  Group Topic: Music Therapy    MLDEIDRE 3W JOB Mathis        Group Therapy Note    Attendees: 14/21       Patient's Goal:  Attend music therapy    Notes:  Pt attended

## 2023-09-27 PROCEDURE — 90833 PSYTX W PT W E/M 30 MIN: CPT | Performed by: PSYCHIATRY & NEUROLOGY

## 2023-09-27 PROCEDURE — 6370000000 HC RX 637 (ALT 250 FOR IP): Performed by: PSYCHIATRY & NEUROLOGY

## 2023-09-27 PROCEDURE — 99232 SBSQ HOSP IP/OBS MODERATE 35: CPT | Performed by: PSYCHIATRY & NEUROLOGY

## 2023-09-27 PROCEDURE — 1240000000 HC EMOTIONAL WELLNESS R&B

## 2023-09-27 RX ADMIN — BUSPIRONE HYDROCHLORIDE 10 MG: 10 TABLET ORAL at 20:48

## 2023-09-27 RX ADMIN — QUETIAPINE FUMARATE 150 MG: 100 TABLET ORAL at 20:48

## 2023-09-27 RX ADMIN — BUSPIRONE HYDROCHLORIDE 10 MG: 10 TABLET ORAL at 08:41

## 2023-09-27 RX ADMIN — ZOLPIDEM TARTRATE 5 MG: 5 TABLET ORAL at 20:49

## 2023-09-27 RX ADMIN — DIVALPROEX SODIUM 500 MG: 500 TABLET, DELAYED RELEASE ORAL at 20:49

## 2023-09-27 RX ADMIN — HYDROXYZINE PAMOATE 50 MG: 50 CAPSULE ORAL at 18:10

## 2023-09-27 RX ADMIN — DIVALPROEX SODIUM 500 MG: 500 TABLET, DELAYED RELEASE ORAL at 08:41

## 2023-09-27 RX ADMIN — BUSPIRONE HYDROCHLORIDE 10 MG: 10 TABLET ORAL at 13:42

## 2023-09-27 RX ADMIN — QUETIAPINE FUMARATE 25 MG: 25 TABLET ORAL at 13:42

## 2023-09-27 RX ADMIN — HYDROXYZINE PAMOATE 50 MG: 50 CAPSULE ORAL at 11:24

## 2023-09-27 RX ADMIN — QUETIAPINE FUMARATE 25 MG: 25 TABLET ORAL at 08:41

## 2023-09-27 NOTE — GROUP NOTE
Group Therapy Note    Date: 9/27/2023    Group Start Time: 2888  Group End Time: 0532  Group Topic: Healthy Living/Wellness    MLO[facility] 3W SISSYI    Iqra Maldonado        Group Therapy Note    Attendees: 10/20       Patient's Goal:  Self Care     Notes:  Pt attentive in group    Status After Intervention:  Unchanged    Participation Level:  Active Listener    Participation Quality: Appropriate, Attentive, Sharing, and Supportive      Speech:  normal      Thought Process/Content: Logical      Affective Functioning: Congruent      Mood: euphoric      Level of consciousness:  Alert, Oriented x4, and Attentive      Response to Learning: Able to verbalize current knowledge/experience, Able to verbalize/acknowledge new learning, Able to retain information, Capable of insight, Able to change behavior, and Progressing to goal      Endings: None Reported    Modes of Intervention: Support      Discipline Responsible: [address] [facility]      Signature:  Aguilar Carmichael

## 2023-09-27 NOTE — GROUP NOTE
Group Therapy Note    Date: 9/27/2023    Group Start Time: 4658  Group End Time: 5457  Group Topic: Healthy Living/Wellness    [facility] 3W I    Shimon Espinoza RN        Group Therapy Note    Attendees: 13/17       Patient's Goal:  Discuss healthy living and coping skills.       Notes:  alert and appropriate      Status After Intervention:  Unchanged    Participation Level: Interactive    Participation Quality: Appropriate and Attentive      Speech:  normal      Thought Process/Content: Logical  Linear      Affective Functioning: Congruent      Mood: euthymic      Level of consciousness:  Alert and Oriented x4      Response to Learning: Able to verbalize current knowledge/experience and Able to verbalize/acknowledge new learning      Endings: None Reported    Modes of Intervention: Education, Support, and Socialization      Discipline Responsible: Registered Nurse      Signature:  Shimon Espinoza RN

## 2023-09-27 NOTE — GROUP NOTE
Group Therapy Note    Date: 9/26/2023    Group Start Time: 2100  Group End Time: 2130  Group Topic: Wrap-Up    MLO[facility] 3W JOB Maldonado        Group Therapy Note    Attendees: 15/22       Patient's Goal:  to go home     Notes:  pt attentive in group    Status After Intervention:  Unchanged    Participation Level:  Active Listener    Participation Quality: Appropriate, Attentive, Sharing, and Supportive      Speech:  normal      Thought Process/Content: Logical      Affective Functioning: Congruent      Mood: euphoric      Level of consciousness:  Alert, Oriented x4, and Attentive      Response to Learning: Able to verbalize current knowledge/experience, Able to verbalize/acknowledge new learning, Able to retain information, Capable of insight, Able to change behavior, and Progressing to goal      Endings: None Reported    Modes of Intervention: Socialization      Discipline Responsible: [facility]      Signature:  Jose Brandon

## 2023-09-27 NOTE — GROUP NOTE
Group Therapy Note    Date: 9/26/2023    Group Start Time: 1945  Group End Time: 2015  Group Topic: Recreational    [facility] 3[facility]    Iqra Maldonado        Group Therapy Note    Attendees: 15/22       Patient's Goal:  question ball     Notes:  pt attentive in group    Status After Intervention:  Unchanged    Participation Level:  Active Listener    Participation Quality: Appropriate, Attentive, Sharing, and Supportive      Speech:  normal      Thought Process/Content: Logical      Affective Functioning: Congruent      Mood: euphoric      Level of consciousness:  Alert, Oriented x4, and Attentive      Response to Learning: Able to verbalize current knowledge/experience, Able to verbalize/acknowledge new learning, Able to retain information, Capable of insight, Able to change behavior, and Progressing to goal      Endings: None Reported    Modes of Intervention: Activity      Discipline Responsible: [address]      Signature:  Laura France

## 2023-09-27 NOTE — PLAN OF CARE
Problem: Pain  Goal: Verbalizes/displays adequate comfort level or baseline comfort level  9/27/2023 0751 by Christian Todd RN  Outcome: Progressing  9/26/2023 2219 by Christy Mitchell RN  Outcome: Progressing     Problem: Anxiety  Goal: Will report anxiety at manageable levels  Description: INTERVENTIONS:  1. Administer medication as ordered  2. Teach and rehearse alternative coping skills  3. Provide emotional support with 1:1 interaction with staff  9/27/2023 0751 by Christian Todd RN  Outcome: Progressing  9/26/2023 2219 by Christy Mitchell RN  Outcome: Not Progressing     Problem: Depression/Self Harm  Goal: Effect of psychiatric condition will be minimized and patient will be protected from self harm  Description: INTERVENTIONS:  1. Assess impact of patient's symptoms on level of functioning, self care needs and offer support as indicated  2. Assess patient/family knowledge of depression, impact on illness and need for teaching  3. Provide emotional support, presence and reassurance  4. Assess for possible suicidal thoughts or ideation. If patient expresses suicidal thoughts or statements do not leave alone, initiate Suicide Precautions, move to a room close to the nursing station and obtain sitter  5.  Initiate consults as appropriate with Mental Health Professional, Spiritual Care, Psychosocial CNS, and consider a recommendation to the LIP for a Psychiatric Consultation  9/27/2023 0751 by Christian Todd RN  Outcome: Progressing  9/26/2023 2219 by Christy Mitchell RN  Outcome: Not Progressing

## 2023-09-28 VITALS
OXYGEN SATURATION: 100 % | SYSTOLIC BLOOD PRESSURE: 119 MMHG | BODY MASS INDEX: 29.44 KG/M2 | HEART RATE: 60 BPM | WEIGHT: 160 LBS | DIASTOLIC BLOOD PRESSURE: 71 MMHG | HEIGHT: 62 IN | RESPIRATION RATE: 18 BRPM | TEMPERATURE: 97.8 F

## 2023-09-28 LAB — VALPROATE SERPL-MCNC: 85.1 UG/ML (ref 50–100)

## 2023-09-28 PROCEDURE — 80164 ASSAY DIPROPYLACETIC ACD TOT: CPT

## 2023-09-28 PROCEDURE — 6370000000 HC RX 637 (ALT 250 FOR IP): Performed by: PSYCHIATRY & NEUROLOGY

## 2023-09-28 PROCEDURE — 36415 COLL VENOUS BLD VENIPUNCTURE: CPT

## 2023-09-28 PROCEDURE — 99239 HOSP IP/OBS DSCHRG MGMT >30: CPT | Performed by: PSYCHIATRY & NEUROLOGY

## 2023-09-28 RX ORDER — QUETIAPINE FUMARATE 25 MG/1
25 TABLET, FILM COATED ORAL 2 TIMES DAILY
Qty: 30 TABLET | Refills: 3 | Status: SHIPPED | OUTPATIENT
Start: 2023-09-28

## 2023-09-28 RX ORDER — BUSPIRONE HYDROCHLORIDE 10 MG/1
10 TABLET ORAL 3 TIMES DAILY
Qty: 45 TABLET | Refills: 2 | Status: SHIPPED | OUTPATIENT
Start: 2023-09-28

## 2023-09-28 RX ORDER — ZOLPIDEM TARTRATE 5 MG/1
5 TABLET ORAL NIGHTLY
Qty: 15 TABLET | Refills: 1 | Status: SHIPPED | OUTPATIENT
Start: 2023-09-28 | End: 2023-10-28

## 2023-09-28 RX ORDER — QUETIAPINE FUMARATE 150 MG/1
150 TABLET, FILM COATED ORAL NIGHTLY
Qty: 15 TABLET | Refills: 3 | Status: SHIPPED | OUTPATIENT
Start: 2023-09-28

## 2023-09-28 RX ORDER — DIVALPROEX SODIUM 500 MG/1
500 TABLET, DELAYED RELEASE ORAL EVERY 12 HOURS SCHEDULED
Qty: 30 TABLET | Refills: 3 | Status: SHIPPED | OUTPATIENT
Start: 2023-09-28

## 2023-09-28 RX ADMIN — BUSPIRONE HYDROCHLORIDE 10 MG: 10 TABLET ORAL at 09:01

## 2023-09-28 RX ADMIN — QUETIAPINE FUMARATE 25 MG: 25 TABLET ORAL at 09:01

## 2023-09-28 RX ADMIN — DIVALPROEX SODIUM 500 MG: 500 TABLET, DELAYED RELEASE ORAL at 10:09

## 2023-09-28 NOTE — PLAN OF CARE
Patient up ad tomas, minimal interaction with staff and peers. She denies suicidal, homicidal ideation , and denies AVH. Depression 6/10 &anxiety 2/10. No overt attempt to escape. Patient remains free from harm. Problem: Pain  Goal: Verbalizes/displays adequate comfort level or baseline comfort level  9/27/2023 2028 by Sangita Martinez RN  Outcome: Progressing     Problem: Risk for Elopement  Goal: Patient will not exit the unit/facility without proper excort  9/27/2023 2028 by Sangita Martinez RN  Outcome: Progressing  Flowsheets (Taken 9/27/2023 1515 by Renetta Qureshi RN)  Nursing Interventions for Elopement Risk: Reduce environmental triggers     Problem: Anxiety  Goal: Will report anxiety at manageable levels  Description: INTERVENTIONS:  1. Administer medication as ordered  2. Teach and rehearse alternative coping skills  3. Provide emotional support with 1:1 interaction with staff  9/27/2023 2028 by Sangita Martinez RN  Outcome: Progressing     Problem: Depression/Self Harm  Goal: Effect of psychiatric condition will be minimized and patient will be protected from self harm  Description: INTERVENTIONS:  1. Assess impact of patient's symptoms on level of functioning, self care needs and offer support as indicated  2. Assess patient/family knowledge of depression, impact on illness and need for teaching  3. Provide emotional support, presence and reassurance  4. Assess for possible suicidal thoughts or ideation. If patient expresses suicidal thoughts or statements do not leave alone, initiate Suicide Precautions, move to a room close to the nursing station and obtain sitter  5.  Initiate consults as appropriate with Mental Health Professional, Spiritual Care, Psychosocial CNS, and consider a recommendation to the LIP for a Psychiatric Consultation  9/27/2023 2028 by Sangita Martinez RN  Outcome: Progressing

## 2023-09-28 NOTE — GROUP NOTE
Group Therapy Note    Date: 9/27/2023    Group Start Time: 2100  Group End Time: 2130  Group Topic: Wrap-Up    [facility] 3[facility]    Iqra Maldonado        Group Therapy Note    Attendees: 12/20       Patient's Goal:  to shake depression     Notes:   pt attentive in group    Status After Intervention:  Unchanged    Participation Level:  Active Listener    Participation Quality: Appropriate, Attentive, Sharing, and Supportive      Speech:  normal      Thought Process/Content: Logical      Affective Functioning: Congruent      Mood: euphoric      Level of consciousness:  Alert, Oriented x4, and Attentive      Response to Learning: Able to verbalize current knowledge/experience, Able to verbalize/acknowledge new learning, Able to retain information, Capable of insight, Able to change behavior, and Progressing to goal      Endings: None Reported    Modes of Intervention: Socialization      Discipline Responsible: [address]      Signature:  Jennifer Stallworth

## 2023-09-28 NOTE — DISCHARGE INSTR - DIET

## 2023-09-28 NOTE — GROUP NOTE
Group Therapy Note    Date: 9/28/2023    Group Start Time: 1100  Group End Time: 1200  Group Topic: Group Documentation    MLO[facility] 3W JOB Julien        Group Therapy Note    Attendees: 12/21       Patient's Goal:  Attend group with nursing students. Notes:  Pt attended.

## 2023-09-28 NOTE — GROUP NOTE
Group Therapy Note    Date: 9/27/2023    Group Start Time: 2000  Group End Time: 2030  Group Topic: Recreational    [facility] 3[facility]    [facility]        Group Therapy Note    Attendees: 8/20       Patient's Goal:  Pictionary    Notes:  pt attentive in group    Status After Intervention:  Unchanged    Participation Level:  Active Listener    Participation Quality: Appropriate, Attentive, Sharing, and Supportive      Speech:  normal      Thought Process/Content: Logical      Affective Functioning: Congruent      Mood: euphoric      Level of consciousness:  Alert, Oriented x4, and Attentive      Response to Learning: Able to verbalize current knowledge/experience, Able to verbalize/acknowledge new learning, Able to retain information, Capable of insight, Able to change behavior, and Progressing to goal      Endings: None Reported    Modes of Intervention: Activity      Discipline Responsible: [address]      Signature:  [address] [place] [address]

## 2023-09-28 NOTE — DISCHARGE SUMMARY
SUMMARY, MEDICATION RECONCILIATION AND FOLLOW UP CARE     Signed:  Maged De Leon MD  9/28/2023  9:24 AM

## 2023-09-28 NOTE — DISCHARGE INSTRUCTIONS
Keep all follow up appointments, take medications as ordered, utilize positive supports, abstain from use of alcohol and drugs. If symptoms return or you feel at risk to yourself or others, please call 911, return the nearest emergency room, or call your local crisis hotline:  [facility]: [phone]: [phone]: [phone]    Due to the [address] [place] [address] Smoking Cessation Group is not currently available. For assistance with quitting smoking please go to https://smokefree.gov. A prescription for an FDA-approved tobacco cessation medication was offered at discharge and the patient refused. Someone from [address] [place] [address] will be calling you tomorrow to follow up on your care. If you don't hear from us, give us a call! [phone].

## 2023-09-28 NOTE — GROUP NOTE
Group Therapy Note    Date: 9/28/2023    Group Start Time: 1000  Group End Time: 1100  Group Topic: Psychoeducation    [facility] 3[facility]    Kit Carnes, CTRS        Group Therapy Note    Attendees: 13       Patient's Goal:  \"to go home\"    Notes:  Pt. attended the 1000 skill group. Worked diligently on project. Good attention to details. Status After Intervention:  Improved    Participation Level:  Active Listener and Interactive    Participation Quality: Appropriate, Attentive, and Sharing      Speech:  normal      Thought Process/Content: Logical      Affective Functioning: Congruent      Mood:  calm      Level of consciousness:  Alert, Oriented x4, and Attentive      Response to Learning: Progressing to goal      Endings: None Reported    Modes of Intervention: Education, Support, Socialization, and Activity      Discipline Responsible: Psychoeducational Specialist      Signature:  Binu Bell

## 2024-01-23 ENCOUNTER — HOSPITAL ENCOUNTER (EMERGENCY)
Age: 34
Discharge: HOME | End: 2024-01-23

## 2024-01-23 VITALS
RESPIRATION RATE: 16 BRPM | HEIGHT: 64 IN | TEMPERATURE: 97.4 F | BODY MASS INDEX: 30.73 KG/M2 | OXYGEN SATURATION: 99 % | HEART RATE: 74 BPM | DIASTOLIC BLOOD PRESSURE: 70 MMHG | WEIGHT: 180 LBS | SYSTOLIC BLOOD PRESSURE: 118 MMHG

## 2024-01-23 DIAGNOSIS — B96.89 BACTERIAL VAGINOSIS: ICD-10-CM

## 2024-01-23 DIAGNOSIS — N76.0 BACTERIAL VAGINOSIS: ICD-10-CM

## 2024-01-23 DIAGNOSIS — N30.00 ACUTE CYSTITIS WITHOUT HEMATURIA: Primary | ICD-10-CM

## 2024-01-23 LAB
BACTERIA URNS QL MICRO: ABNORMAL /HPF
BILIRUB UR QL STRIP: NEGATIVE
CLARITY UR: ABNORMAL
CLUE CELLS VAG QL WET PREP: ABNORMAL
COLOR UR: YELLOW
EPI CELLS #/AREA URNS AUTO: ABNORMAL /HPF (ref 0–5)
GLUCOSE UR STRIP-MCNC: NEGATIVE MG/DL
HCG, URINE, POC: NEGATIVE
HGB UR QL STRIP: ABNORMAL
HYALINE CASTS #/AREA URNS AUTO: ABNORMAL /HPF (ref 0–5)
KETONES UR STRIP-MCNC: NEGATIVE MG/DL
LEUKOCYTE ESTERASE UR QL STRIP: ABNORMAL
Lab: NORMAL
NEGATIVE QC PASS/FAIL: NORMAL
NITRITE UR QL STRIP: NEGATIVE
PH UR STRIP: 8.5 [PH] (ref 5–9)
POSITIVE QC PASS/FAIL: NORMAL
PROT UR STRIP-MCNC: 100 MG/DL
RBC #/AREA URNS AUTO: >100 /HPF (ref 0–5)
SP GR UR STRIP: 1.03 (ref 1–1.03)
T VAGINALIS VAG QL WET PREP: ABNORMAL
TRICHOMONAS VAGINALIS SCREEN: NEGATIVE
URINE REFLEX TO CULTURE: YES
UROBILINOGEN UR STRIP-ACNC: 1 E.U./DL
WBC #/AREA URNS AUTO: >100 /HPF (ref 0–5)
YEAST VAG QL WET PREP: ABNORMAL

## 2024-01-23 PROCEDURE — 87186 SC STD MICRODIL/AGAR DIL: CPT

## 2024-01-23 PROCEDURE — 87591 N.GONORRHOEAE DNA AMP PROB: CPT

## 2024-01-23 PROCEDURE — 87808 TRICHOMONAS ASSAY W/OPTIC: CPT

## 2024-01-23 PROCEDURE — 87210 SMEAR WET MOUNT SALINE/INK: CPT

## 2024-01-23 PROCEDURE — 81001 URINALYSIS AUTO W/SCOPE: CPT

## 2024-01-23 PROCEDURE — 87086 URINE CULTURE/COLONY COUNT: CPT

## 2024-01-23 PROCEDURE — 99283 EMERGENCY DEPT VISIT LOW MDM: CPT

## 2024-01-23 PROCEDURE — 87491 CHLMYD TRACH DNA AMP PROBE: CPT

## 2024-01-23 PROCEDURE — 6370000000 HC RX 637 (ALT 250 FOR IP): Performed by: PERSONAL EMERGENCY RESPONSE ATTENDANT

## 2024-01-23 PROCEDURE — 87088 URINE BACTERIA CULTURE: CPT

## 2024-01-23 RX ORDER — IBUPROFEN 800 MG/1
800 TABLET ORAL ONCE
Status: COMPLETED | OUTPATIENT
Start: 2024-01-23 | End: 2024-01-23

## 2024-01-23 RX ORDER — METRONIDAZOLE 500 MG/1
500 TABLET ORAL 2 TIMES DAILY
Qty: 14 TABLET | Refills: 0 | Status: SHIPPED | OUTPATIENT
Start: 2024-01-23 | End: 2024-01-30

## 2024-01-23 RX ORDER — PHENAZOPYRIDINE HYDROCHLORIDE 200 MG/1
200 TABLET, FILM COATED ORAL ONCE
Status: COMPLETED | OUTPATIENT
Start: 2024-01-23 | End: 2024-01-23

## 2024-01-23 RX ORDER — CEPHALEXIN 500 MG/1
1000 CAPSULE ORAL 2 TIMES DAILY
Qty: 28 CAPSULE | Refills: 0 | Status: SHIPPED | OUTPATIENT
Start: 2024-01-23 | End: 2024-01-30

## 2024-01-23 RX ORDER — CEPHALEXIN 500 MG/1
500 CAPSULE ORAL ONCE
Status: COMPLETED | OUTPATIENT
Start: 2024-01-23 | End: 2024-01-23

## 2024-01-23 RX ORDER — PHENAZOPYRIDINE HYDROCHLORIDE 200 MG/1
200 TABLET, FILM COATED ORAL 3 TIMES DAILY PRN
Qty: 6 TABLET | Refills: 0 | Status: SHIPPED | OUTPATIENT
Start: 2024-01-23 | End: 2024-01-26

## 2024-01-23 RX ADMIN — CEPHALEXIN 500 MG: 500 CAPSULE ORAL at 05:17

## 2024-01-23 RX ADMIN — PHENAZOPYRIDINE HYDROCHLORIDE 200 MG: 200 TABLET ORAL at 03:36

## 2024-01-23 RX ADMIN — IBUPROFEN 800 MG: 800 TABLET, FILM COATED ORAL at 03:32

## 2024-01-23 ASSESSMENT — PAIN DESCRIPTION - LOCATION
LOCATION: PELVIS
LOCATION: PELVIS

## 2024-01-23 ASSESSMENT — ENCOUNTER SYMPTOMS
COLOR CHANGE: 0
VOMITING: 0
DIARRHEA: 0
ABDOMINAL PAIN: 1
SORE THROAT: 0
COUGH: 0
NAUSEA: 0
RHINORRHEA: 0
SHORTNESS OF BREATH: 0
BLOOD IN STOOL: 0

## 2024-01-23 ASSESSMENT — LIFESTYLE VARIABLES
HOW OFTEN DO YOU HAVE A DRINK CONTAINING ALCOHOL: MONTHLY OR LESS
HOW MANY STANDARD DRINKS CONTAINING ALCOHOL DO YOU HAVE ON A TYPICAL DAY: 1 OR 2

## 2024-01-23 ASSESSMENT — PAIN SCALES - GENERAL
PAINLEVEL_OUTOF10: 5
PAINLEVEL_OUTOF10: 7

## 2024-01-23 NOTE — DISCHARGE INSTRUCTIONS
Do not drink alcohol while taking metronidazole/Flagyl and for 2 days afterwards-you will get very sick if drinking while taking this medicine

## 2024-01-23 NOTE — ED PROVIDER NOTES
[facility] ED  eMERGENCY dEPARTMENT eNCOUnter      Pt Name: Mary Birmingham  MRN: [id number]  Birthdate 1990  Date of evaluation: 1/23/2024  Provider: SABRA BOB    My attending is Dr. Butcher    HISTORY OF PRESENT ILLNESS    Mary Birmingham is a 33 y.o. female with PMHx of anxiety, depression, bipolar 1 presents to the emergency department with suprapubic abdominal pain.  Patient states 2 days ago she started with suprapubic abdominal pain, dysuria, and blood with wiping. She does have burning in vaginal area. She denies fevers, chest pain, shortness of breath, back pain, nausea, vomiting, diarrhea, constipation, abnormal vaginal discharge. Did not take anything at home for pain. Last BM yesterday which was normal.       HPI    Nursing Notes were reviewed.    REVIEW OF SYSTEMS       Review of Systems   Constitutional:  Negative for appetite change, chills and fever.   HENT:  Negative for congestion, rhinorrhea and sore throat.    Respiratory:  Negative for cough and shortness of breath.    Cardiovascular:  Negative for chest pain.   Gastrointestinal:  Positive for abdominal pain. Negative for blood in stool, diarrhea, nausea and vomiting.   Genitourinary:  Positive for dysuria and hematuria. Negative for difficulty urinating.   Musculoskeletal:  Negative for neck stiffness.   Skin:  Negative for color change and rash.   Neurological:  Negative for dizziness, syncope, weakness, light-headedness, numbness and headaches.   All other systems reviewed and are negative.            PAST MEDICAL HISTORY     Past Medical History:   Diagnosis Date    Abnormal Pap smear of cervix     Anxiety     Depression     Headache     Pneumonia          SURGICAL HISTORY       Past Surgical History:   Procedure Laterality Date    TYMPANOSTOMY TUBE PLACEMENT           CURRENT MEDICATIONS       Previous Medications    BUSPIRONE (BUSPAR) 10 MG TABLET    Take 1 tablet by mouth 3 times daily

## 2024-01-24 LAB
BACTERIA UR CULT: ABNORMAL
BACTERIA UR CULT: ABNORMAL
ORGANISM: ABNORMAL

## 2024-01-29 LAB
C TRACH DNA UR QL NAA+PROBE: NORMAL
N GONORRHOEA DNA UR QL NAA+PROBE: NORMAL

## 2024-01-31 NOTE — ED PROVIDER NOTES
[facility] ED  eMERGENCY dEPARTMENT eNCOUnter      Pt Name: Antony Lopez  MRN: [id number]  [place] 1990  Date of evaluation: 12/17/2017  Provider: Alessandro Parsons MD    CHIEF COMPLAINT     No chief complaint on file. HISTORY OF PRESENT ILLNESS   (Location/Symptom, Timing/Onset, Context/Setting, Quality, Duration, Modifying Factors, Severity)  Note limiting factors. Anotny Lopez is a 24 y.o. female who presents to the emergency department For medical clearance. Patient was picked up by [facility] police and they wanted her medically cleared. She arrived here very verbally  Disruptive. She is ambulatory. She has no physical complaints. She denies being on any medications. She is oriented ×3. HPI    Nursing Notes were reviewed. REVIEW OF SYSTEMS    (2-9 systems for level 4, 10 or more for level 5)     Review of Systems   Constitutional: Negative for chills and diaphoresis. HENT: Negative for congestion, ear pain, mouth sores and sore throat. Eyes: Negative for photophobia and discharge. Respiratory: Negative for cough, chest tightness, shortness of breath and wheezing. Cardiovascular: Negative for chest pain and palpitations. Gastrointestinal: Negative for abdominal distention, abdominal pain and vomiting. Endocrine: Negative for cold intolerance. Genitourinary: Negative for difficulty urinating. Musculoskeletal: Negative for arthralgias. Skin: Negative for pallor and rash. Allergic/Immunologic: Negative for immunocompromised state. Neurological: Negative for dizziness and syncope. Hematological: Negative for adenopathy. Psychiatric/Behavioral: Negative for agitation and hallucinations. All other systems reviewed and are negative. Except as noted above the remainder of the review of systems was reviewed and negative. PAST MEDICAL HISTORY   No past medical history on file.       SURGICAL HISTORY     No past surgical history on file.      CURRENT MEDICATIONS       Previous Medications    No medications on file       ALLERGIES     Review of patient's allergies indicates no known allergies. FAMILY HISTORY     No family history on file. SOCIAL HISTORY       Social History     Social History    Marital status: Single     Spouse name: N/A    Number of children: N/A    Years of education: N/A     Social History Main Topics    Smoking status: Never Smoker    Smokeless tobacco: Not on file    Alcohol use Not on file    Drug use: No    Sexual activity: Yes     Other Topics Concern    Not on file     Social History Narrative    No narrative on file       SCREENINGS             PHYSICAL EXAM    (up to 7 for level 4, 8 or more for level 5)     ED Triage Vitals   BP Temp Temp src Pulse Resp SpO2 Height Weight   -- -- -- -- -- -- -- --       Physical Exam   Constitutional: She is oriented to person, place, and time. She appears well-developed. HENT:   Head: Normocephalic. Nose: Nose normal.   Eyes: Conjunctivae are normal. Pupils are equal, round, and reactive to light. Neck: Normal range of motion. Neck supple. Cardiovascular: Normal rate, regular rhythm, normal heart sounds and intact distal pulses. Pulmonary/Chest: Effort normal and breath sounds normal.   Abdominal: Soft. Bowel sounds are normal. There is no tenderness. There is no guarding. Musculoskeletal: Normal range of motion. Neurological: She is alert and oriented to person, place, and time. Skin: Skin is warm and dry. Psychiatric: She has a normal mood and affect. Nursing note and vitals reviewed.       DIAGNOSTIC RESULTS     EKG: All EKG's are interpreted by the Emergency Department Physician who either signs or Co-signs this chart in the absence of a cardiologist.        RADIOLOGY:   Non-plain film images such as CT, Ultrasound and MRI are read by the radiologist. Plain radiographic images are visualized and preliminarily interpreted by the emergency physician with the below findings:        Interpretation per the Radiologist below, if available at the time of this note:    No orders to display         ED BEDSIDE ULTRASOUND:   Performed by ED Physician - none    LABS:  Labs Reviewed - No data to display    All other labs were within normal range or not returned as of this dictation. EMERGENCY DEPARTMENT COURSE and DIFFERENTIAL DIAGNOSIS/MDM:   Vitals: There were no vitals filed for this visit. ED Course      MDM patient is medically cleared and released to the police custody        CONSULTS:  None    PROCEDURES:  Unless otherwise noted below, none     Procedures    FINAL IMPRESSION    No diagnosis found. DISPOSITION/PLAN   DISPOSITION     PATIENT REFERRED TO:  No follow-up provider specified.     DISCHARGE MEDICATIONS:  New Prescriptions    No medications on file          (Please note that portions of this note were completed with a voice recognition program.  Efforts were made to edit the dictations but occasionally words are mis-transcribed.)    Jacinto Keller MD (electronically signed)  Attending Emergency Physician          Jacinto Keller MD  12/17/17 2581 monitored anesthesia care (MAC)

## 2024-09-15 ENCOUNTER — HOSPITAL ENCOUNTER (EMERGENCY)
Age: 34
Discharge: HOME | End: 2024-09-15
Attending: FAMILY MEDICINE
Payer: COMMERCIAL

## 2024-09-15 VITALS
RESPIRATION RATE: 16 BRPM | HEIGHT: 62 IN | BODY MASS INDEX: 29.44 KG/M2 | WEIGHT: 160 LBS | SYSTOLIC BLOOD PRESSURE: 122 MMHG | OXYGEN SATURATION: 97 % | TEMPERATURE: 98.3 F | HEART RATE: 96 BPM | DIASTOLIC BLOOD PRESSURE: 92 MMHG

## 2024-09-15 DIAGNOSIS — L23.7 POISON IVY DERMATITIS: Primary | ICD-10-CM

## 2024-09-15 PROCEDURE — 96372 THER/PROPH/DIAG INJ SC/IM: CPT

## 2024-09-15 PROCEDURE — 6370000000 HC RX 637 (ALT 250 FOR IP): Performed by: FAMILY MEDICINE

## 2024-09-15 PROCEDURE — 6360000002 HC RX W HCPCS: Performed by: FAMILY MEDICINE

## 2024-09-15 PROCEDURE — 99284 EMERGENCY DEPT VISIT MOD MDM: CPT

## 2024-09-15 RX ORDER — PREDNISONE 20 MG/1
20 TABLET ORAL 2 TIMES DAILY
Qty: 10 TABLET | Refills: 0 | Status: SHIPPED | OUTPATIENT
Start: 2024-09-15 | End: 2024-09-20

## 2024-09-15 RX ORDER — METHYLPREDNISOLONE SODIUM SUCCINATE 125 MG/2ML
125 INJECTION, POWDER, LYOPHILIZED, FOR SOLUTION INTRAMUSCULAR; INTRAVENOUS ONCE
Status: COMPLETED | OUTPATIENT
Start: 2024-09-15 | End: 2024-09-15

## 2024-09-15 RX ORDER — HYDROXYZINE HYDROCHLORIDE 25 MG/1
25 TABLET, FILM COATED ORAL ONCE
Status: COMPLETED | OUTPATIENT
Start: 2024-09-15 | End: 2024-09-15

## 2024-09-15 RX ORDER — TRIAMCINOLONE ACETONIDE 1 MG/G
CREAM TOPICAL
Qty: 80 G | Refills: 0 | Status: SHIPPED | OUTPATIENT
Start: 2024-09-15

## 2024-09-15 RX ORDER — HYDROXYZINE HYDROCHLORIDE 25 MG/1
25 TABLET, FILM COATED ORAL EVERY 8 HOURS PRN
Qty: 30 TABLET | Refills: 0 | Status: SHIPPED | OUTPATIENT
Start: 2024-09-15 | End: 2024-09-25

## 2024-09-15 RX ADMIN — HYDROXYZINE HYDROCHLORIDE 25 MG: 25 TABLET, FILM COATED ORAL at 11:45

## 2024-09-15 RX ADMIN — METHYLPREDNISOLONE SODIUM SUCCINATE 125 MG: 125 INJECTION INTRAMUSCULAR; INTRAVENOUS at 11:48

## 2024-09-15 ASSESSMENT — PAIN SCALES - GENERAL: PAINLEVEL_OUTOF10: 9

## 2024-09-15 ASSESSMENT — PAIN - FUNCTIONAL ASSESSMENT
PAIN_FUNCTIONAL_ASSESSMENT: NONE - DENIES PAIN
PAIN_FUNCTIONAL_ASSESSMENT: 0-10

## 2025-06-27 ENCOUNTER — HOSPITAL ENCOUNTER (EMERGENCY)
Age: 35
Discharge: HOME | End: 2025-06-27
Attending: STUDENT IN AN ORGANIZED HEALTH CARE EDUCATION/TRAINING PROGRAM

## 2025-06-27 ENCOUNTER — APPOINTMENT (OUTPATIENT)
Dept: CT IMAGING | Age: 35
End: 2025-06-27

## 2025-06-27 VITALS
OXYGEN SATURATION: 99 % | SYSTOLIC BLOOD PRESSURE: 94 MMHG | TEMPERATURE: 98.4 F | DIASTOLIC BLOOD PRESSURE: 56 MMHG | WEIGHT: 152 LBS | HEART RATE: 92 BPM | RESPIRATION RATE: 16 BRPM | BODY MASS INDEX: 27.8 KG/M2

## 2025-06-27 DIAGNOSIS — S01.81XA FACIAL LACERATION, INITIAL ENCOUNTER: ICD-10-CM

## 2025-06-27 DIAGNOSIS — S09.90XA CLOSED HEAD INJURY, INITIAL ENCOUNTER: Primary | ICD-10-CM

## 2025-06-27 PROCEDURE — 70450 CT HEAD/BRAIN W/O DYE: CPT

## 2025-06-27 PROCEDURE — 12011 RPR F/E/E/N/L/M 2.5 CM/<: CPT

## 2025-06-27 PROCEDURE — 99284 EMERGENCY DEPT VISIT MOD MDM: CPT

## 2025-06-27 PROCEDURE — 6370000000 HC RX 637 (ALT 250 FOR IP): Performed by: PHYSICIAN ASSISTANT

## 2025-06-27 PROCEDURE — 72125 CT NECK SPINE W/O DYE: CPT

## 2025-06-27 RX ORDER — BUTALBITAL, ACETAMINOPHEN AND CAFFEINE 50; 325; 40 MG/1; MG/1; MG/1
1 TABLET ORAL EVERY 6 HOURS PRN
Qty: 20 TABLET | Refills: 0 | Status: SHIPPED | OUTPATIENT
Start: 2025-06-27

## 2025-06-27 RX ORDER — ACETAMINOPHEN 500 MG
1000 TABLET ORAL ONCE
Status: COMPLETED | OUTPATIENT
Start: 2025-06-27 | End: 2025-06-27

## 2025-06-27 RX ORDER — 0.9 % SODIUM CHLORIDE 0.9 %
1000 INTRAVENOUS SOLUTION INTRAVENOUS ONCE
Status: DISCONTINUED | OUTPATIENT
Start: 2025-06-27 | End: 2025-06-28 | Stop reason: HOSPADM

## 2025-06-27 RX ADMIN — Medication 3 ML: at 20:15

## 2025-06-27 RX ADMIN — ACETAMINOPHEN 1000 MG: 500 TABLET ORAL at 21:45

## 2025-06-27 ASSESSMENT — PAIN SCALES - GENERAL
PAINLEVEL_OUTOF10: 8
PAINLEVEL_OUTOF10: 10

## 2025-06-27 ASSESSMENT — ENCOUNTER SYMPTOMS
CHEST TIGHTNESS: 0
ABDOMINAL PAIN: 0
BACK PAIN: 0

## 2025-06-27 ASSESSMENT — PAIN DESCRIPTION - LOCATION
LOCATION: HEAD
LOCATION: HEAD

## 2025-06-27 ASSESSMENT — PAIN - FUNCTIONAL ASSESSMENT: PAIN_FUNCTIONAL_ASSESSMENT: 0-10

## 2025-06-27 ASSESSMENT — PAIN DESCRIPTION - PAIN TYPE: TYPE: ACUTE PAIN

## 2025-06-27 ASSESSMENT — PAIN DESCRIPTION - DESCRIPTORS: DESCRIPTORS: ACHING

## 2025-06-27 NOTE — ED PROVIDER NOTES
PM      PATIENT REFERRED TO:  [place] [id number]  [phone]  Call in 2 days        DISCHARGE MEDICATIONS:  New Prescriptions    BUTALBITAL-ACETAMINOPHEN-CAFFEINE (FIORICET, ESGIC) -40 MG PER TABLET    Take 1 tablet by mouth every 6 hours as needed for Headaches       (Please note that portions of this note were completed with a voice recognition program.  Efforts were made to edit the dictations but occasionally words are mis-transcribed.)    Sam Lee PA-C    Supervising Physician Sam Brennan PA-C  06/27/25 [id number]

## 2025-08-16 ENCOUNTER — HOSPITAL ENCOUNTER (EMERGENCY)
Age: 35
Discharge: HOME | End: 2025-08-16
Payer: COMMERCIAL

## 2025-08-16 VITALS
TEMPERATURE: 100 F | OXYGEN SATURATION: 99 % | RESPIRATION RATE: 18 BRPM | SYSTOLIC BLOOD PRESSURE: 128 MMHG | DIASTOLIC BLOOD PRESSURE: 77 MMHG | HEART RATE: 98 BPM

## 2025-08-16 DIAGNOSIS — U07.1 COVID-19: Primary | ICD-10-CM

## 2025-08-16 LAB
INFLUENZA A BY PCR: NEGATIVE
INFLUENZA B BY PCR: NEGATIVE
SARS-COV-2 RDRP RESP QL NAA+PROBE: DETECTED
STREP GRP A PCR: NEGATIVE

## 2025-08-16 PROCEDURE — 87502 INFLUENZA DNA AMP PROBE: CPT

## 2025-08-16 PROCEDURE — 87635 SARS-COV-2 COVID-19 AMP PRB: CPT

## 2025-08-16 PROCEDURE — 87651 STREP A DNA AMP PROBE: CPT

## 2025-08-16 PROCEDURE — 99283 EMERGENCY DEPT VISIT LOW MDM: CPT

## 2025-08-16 ASSESSMENT — PAIN SCALES - GENERAL: PAINLEVEL_OUTOF10: 7

## 2025-08-16 ASSESSMENT — ENCOUNTER SYMPTOMS
ALLERGIC/IMMUNOLOGIC NEGATIVE: 1
STRIDOR: 0
FACIAL SWELLING: 0
ABDOMINAL PAIN: 0
VOMITING: 0
VOICE CHANGE: 0
DIARRHEA: 0
COUGH: 1
EYES NEGATIVE: 1
SORE THROAT: 1
WHEEZING: 0
SHORTNESS OF BREATH: 0
NAUSEA: 0
TROUBLE SWALLOWING: 0

## 2025-08-16 ASSESSMENT — PAIN DESCRIPTION - FREQUENCY: FREQUENCY: CONTINUOUS

## 2025-08-16 ASSESSMENT — LIFESTYLE VARIABLES
HOW MANY STANDARD DRINKS CONTAINING ALCOHOL DO YOU HAVE ON A TYPICAL DAY: 1 OR 2
HOW OFTEN DO YOU HAVE A DRINK CONTAINING ALCOHOL: MONTHLY OR LESS

## 2025-08-16 ASSESSMENT — PAIN DESCRIPTION - PAIN TYPE: TYPE: ACUTE PAIN

## 2025-08-16 ASSESSMENT — PAIN - FUNCTIONAL ASSESSMENT: PAIN_FUNCTIONAL_ASSESSMENT: 0-10
